# Patient Record
Sex: MALE | Race: WHITE | NOT HISPANIC OR LATINO | URBAN - METROPOLITAN AREA
[De-identification: names, ages, dates, MRNs, and addresses within clinical notes are randomized per-mention and may not be internally consistent; named-entity substitution may affect disease eponyms.]

---

## 2018-08-21 ENCOUNTER — INPATIENT (INPATIENT)
Facility: HOSPITAL | Age: 68
LOS: 5 days | Discharge: HOME HEALTH | End: 2018-08-27
Attending: INTERNAL MEDICINE | Admitting: INTERNAL MEDICINE
Payer: MEDICARE

## 2018-08-21 VITALS
DIASTOLIC BLOOD PRESSURE: 77 MMHG | RESPIRATION RATE: 16 BRPM | SYSTOLIC BLOOD PRESSURE: 103 MMHG | OXYGEN SATURATION: 96 % | HEART RATE: 97 BPM | TEMPERATURE: 98 F

## 2018-08-21 LAB
ALBUMIN SERPL ELPH-MCNC: 2.3 G/DL — LOW (ref 3.3–5)
ALP SERPL-CCNC: 88 U/L — SIGNIFICANT CHANGE UP (ref 40–120)
ALT FLD-CCNC: 97 U/L — HIGH (ref 4–41)
APTT BLD: 28.9 SEC — SIGNIFICANT CHANGE UP (ref 27.5–37.4)
AST SERPL-CCNC: 63 U/L — HIGH (ref 4–40)
BASE EXCESS BLDV CALC-SCNC: 1.4 MMOL/L — SIGNIFICANT CHANGE UP
BASOPHILS # BLD AUTO: 0.04 K/UL — SIGNIFICANT CHANGE UP (ref 0–0.2)
BASOPHILS NFR BLD AUTO: 0.2 % — SIGNIFICANT CHANGE UP (ref 0–2)
BILIRUB SERPL-MCNC: 0.8 MG/DL — SIGNIFICANT CHANGE UP (ref 0.2–1.2)
BLD GP AB SCN SERPL QL: NEGATIVE — SIGNIFICANT CHANGE UP
BLOOD GAS VENOUS - CREATININE: 0.79 MG/DL — SIGNIFICANT CHANGE UP (ref 0.5–1.3)
BUN SERPL-MCNC: 22 MG/DL — SIGNIFICANT CHANGE UP (ref 7–23)
CALCIUM SERPL-MCNC: 8.4 MG/DL — SIGNIFICANT CHANGE UP (ref 8.4–10.5)
CHLORIDE BLDV-SCNC: 97 MMOL/L — SIGNIFICANT CHANGE UP (ref 96–108)
CHLORIDE SERPL-SCNC: 94 MMOL/L — LOW (ref 98–107)
CO2 SERPL-SCNC: 22 MMOL/L — SIGNIFICANT CHANGE UP (ref 22–31)
CREAT SERPL-MCNC: 0.85 MG/DL — SIGNIFICANT CHANGE UP (ref 0.5–1.3)
EOSINOPHIL # BLD AUTO: 0 K/UL — SIGNIFICANT CHANGE UP (ref 0–0.5)
EOSINOPHIL NFR BLD AUTO: 0 % — SIGNIFICANT CHANGE UP (ref 0–6)
GAS PNL BLDV: 130 MMOL/L — LOW (ref 136–146)
GLUCOSE BLDV-MCNC: 129 — HIGH (ref 70–99)
GLUCOSE SERPL-MCNC: 127 MG/DL — HIGH (ref 70–99)
HCO3 BLDV-SCNC: 25 MMOL/L — SIGNIFICANT CHANGE UP (ref 20–27)
HCT VFR BLD CALC: 40.4 % — SIGNIFICANT CHANGE UP (ref 39–50)
HCT VFR BLDV CALC: 43.1 % — SIGNIFICANT CHANGE UP (ref 39–51)
HGB BLD-MCNC: 13 G/DL — SIGNIFICANT CHANGE UP (ref 13–17)
HGB BLDV-MCNC: 14 G/DL — SIGNIFICANT CHANGE UP (ref 13–17)
IMM GRANULOCYTES # BLD AUTO: 0.37 # — SIGNIFICANT CHANGE UP
IMM GRANULOCYTES NFR BLD AUTO: 2.1 % — HIGH (ref 0–1.5)
INR BLD: 1.57 — HIGH (ref 0.88–1.17)
LACTATE BLDV-MCNC: 1.7 MMOL/L — SIGNIFICANT CHANGE UP (ref 0.5–2)
LYMPHOCYTES # BLD AUTO: 1.4 K/UL — SIGNIFICANT CHANGE UP (ref 1–3.3)
LYMPHOCYTES # BLD AUTO: 8 % — LOW (ref 13–44)
MCHC RBC-ENTMCNC: 31.1 PG — SIGNIFICANT CHANGE UP (ref 27–34)
MCHC RBC-ENTMCNC: 32.2 % — SIGNIFICANT CHANGE UP (ref 32–36)
MCV RBC AUTO: 96.7 FL — SIGNIFICANT CHANGE UP (ref 80–100)
MONOCYTES # BLD AUTO: 0.81 K/UL — SIGNIFICANT CHANGE UP (ref 0–0.9)
MONOCYTES NFR BLD AUTO: 4.6 % — SIGNIFICANT CHANGE UP (ref 2–14)
NEUTROPHILS # BLD AUTO: 14.81 K/UL — HIGH (ref 1.8–7.4)
NEUTROPHILS NFR BLD AUTO: 85.1 % — HIGH (ref 43–77)
NRBC # FLD: 0 — SIGNIFICANT CHANGE UP
NT-PROBNP SERPL-SCNC: 243.4 PG/ML — SIGNIFICANT CHANGE UP
PCO2 BLDV: 36 MMHG — LOW (ref 41–51)
PH BLDV: 7.45 PH — HIGH (ref 7.32–7.43)
PLATELET # BLD AUTO: 335 K/UL — SIGNIFICANT CHANGE UP (ref 150–400)
PMV BLD: 10.6 FL — SIGNIFICANT CHANGE UP (ref 7–13)
PO2 BLDV: 30 MMHG — LOW (ref 35–40)
POTASSIUM BLDV-SCNC: 4.3 MMOL/L — SIGNIFICANT CHANGE UP (ref 3.4–4.5)
POTASSIUM SERPL-MCNC: 4.3 MMOL/L — SIGNIFICANT CHANGE UP (ref 3.5–5.3)
POTASSIUM SERPL-SCNC: 4.3 MMOL/L — SIGNIFICANT CHANGE UP (ref 3.5–5.3)
PROT SERPL-MCNC: 8.2 G/DL — SIGNIFICANT CHANGE UP (ref 6–8.3)
PROTHROM AB SERPL-ACNC: 18.2 SEC — HIGH (ref 9.8–13.1)
RBC # BLD: 4.18 M/UL — LOW (ref 4.2–5.8)
RBC # FLD: 13.4 % — SIGNIFICANT CHANGE UP (ref 10.3–14.5)
RH IG SCN BLD-IMP: POSITIVE — SIGNIFICANT CHANGE UP
SAO2 % BLDV: 52.5 % — LOW (ref 60–85)
SODIUM SERPL-SCNC: 128 MMOL/L — LOW (ref 135–145)
TROPONIN T, HIGH SENSITIVITY: < 6 NG/L — SIGNIFICANT CHANGE UP (ref ?–14)
WBC # BLD: 17.43 K/UL — HIGH (ref 3.8–10.5)
WBC # FLD AUTO: 17.43 K/UL — HIGH (ref 3.8–10.5)

## 2018-08-21 PROCEDURE — 71045 X-RAY EXAM CHEST 1 VIEW: CPT | Mod: 26

## 2018-08-21 RX ORDER — PIPERACILLIN AND TAZOBACTAM 4; .5 G/20ML; G/20ML
3.38 INJECTION, POWDER, LYOPHILIZED, FOR SOLUTION INTRAVENOUS ONCE
Qty: 0 | Refills: 0 | Status: COMPLETED | OUTPATIENT
Start: 2018-08-21 | End: 2018-08-21

## 2018-08-21 RX ORDER — SODIUM CHLORIDE 9 MG/ML
1000 INJECTION INTRAMUSCULAR; INTRAVENOUS; SUBCUTANEOUS ONCE
Qty: 0 | Refills: 0 | Status: COMPLETED | OUTPATIENT
Start: 2018-08-21 | End: 2018-08-21

## 2018-08-21 RX ORDER — VANCOMYCIN HCL 1 G
1000 VIAL (EA) INTRAVENOUS ONCE
Qty: 0 | Refills: 0 | Status: COMPLETED | OUTPATIENT
Start: 2018-08-21 | End: 2018-08-22

## 2018-08-21 RX ADMIN — PIPERACILLIN AND TAZOBACTAM 200 GRAM(S): 4; .5 INJECTION, POWDER, LYOPHILIZED, FOR SOLUTION INTRAVENOUS at 23:35

## 2018-08-21 RX ADMIN — SODIUM CHLORIDE 1000 MILLILITER(S): 9 INJECTION INTRAMUSCULAR; INTRAVENOUS; SUBCUTANEOUS at 20:51

## 2018-08-21 RX ADMIN — SODIUM CHLORIDE 1000 MILLILITER(S): 9 INJECTION INTRAMUSCULAR; INTRAVENOUS; SUBCUTANEOUS at 22:00

## 2018-08-21 NOTE — CONSULT NOTE ADULT - ATTENDING COMMENTS
patient with severe bullous disease predominantly upper lobe. underlying pneumonia In left upper lobe. would recommend medical management of pneumonia. may be candidate for LVRS if meets criteria. can be completed as outpatient. VQ scan/echo/abg/pft's/assess for pah.   follow up as outpatient once treatment for pneumonia complete

## 2018-08-21 NOTE — ED ADULT NURSE NOTE - NSIMPLEMENTINTERV_GEN_ALL_ED
Implemented All Universal Safety Interventions:  [place] to call system. Call bell, personal items and telephone within reach. Instruct patient to call for assistance. Room bathroom lighting operational. Non-slip footwear when patient is off stretcher. Physically safe environment: no spills, clutter or unnecessary equipment. Stretcher in lowest position, wheels locked, appropriate side rails in place.

## 2018-08-21 NOTE — ED PROVIDER NOTE - MEDICAL DECISION MAKING DETAILS
effusion, may be 2/2 cancer vs pna vs other infection vs CHF. will do basic labs, cardiac enzymes, pro BNP, CXR, CTA, dispo pending.

## 2018-08-21 NOTE — CONSULT NOTE ADULT - SUBJECTIVE AND OBJECTIVE BOX
68 year old male with past history of right pleural effusion requiring chest tube drainage in 2006 presents to ER with left pleural effusion.  Patient was visiting [place] and felt chest pain radiating to back about three weeks ago and traveled to [place] for medical follow up.  Workup showed left pleural effusion. Patient returned from [place] today and presented to ER for further treatment.  Patient states that he has loss of appetite and weight loss noted.  Patient states that since 2006, he has to take frequent stops to catch his breath.      PAST MEDICAL & SURGICAL HISTORY:  Right pleural effusion in 2006 requiring chest tube   No significant past surgical history    Allergies    No Known Allergies    Intolerances    Home Medications:  none    Social History:  Denies alcohol use  Quit smoking in 1990, smoked half a pack/day     REVIEW OF SYSTEMS      General: +weight loss     Skin/Breast: No Rashes/ Lesions/ Masses  	  Ophthalmologic: No Blurry vision/ Glaucoma/ Blindness  	  ENMT: No Hearing loss/ Drainage/ Lesions	    Respiratory and Thorax: +Cough with Sputum production  	  Cardiovascular: Chest pain radiating to back     Gastrointestinal: No Nausea/ Vomiting/ Constipation/+ Appetite Change	    Genitourinary: No Heamturia/ Dysuria/ Frequency change/ Impotence	    Musculoskeletal: No Pain/ Weakness/ Claudication	    Neurological: No Seizures/ TIA/CVA/ Parastesias	    Psychiatric: No Dementia/ Depression/ SI/HI	    Hematology/Lymphatics: No hx of bleeding/ +Bilateral pedal Edema	    Endocrine:	No Hyperglycemia/ Hypoglycemia    Allergic/Immunologic:	 No Anaphylaxis/ Intolerance/ Recent illnesses    PHYSICAL EXAM:  Vital Signs Last 24 Hrs  T(C): 36.9 (21 Aug 2018 22:17), Max: 37.1 (21 Aug 2018 20:50)  T(F): 98.5 (21 Aug 2018 22:17), Max: 98.7 (21 Aug 2018 20:50)  HR: 77 (21 Aug 2018 22:17) (77 - 97)  BP: 98/70 (21 Aug 2018 22:17) (96/63 - 103/77)  BP(mean): --  RR: 23 (21 Aug 2018 22:17) (16 - 23)  SpO2: 98% (21 Aug 2018 22:17) (94% - 98%)    General: WN/WD NAD  Neurology: A&Ox3, nonfocal, JACOBSON x 4  Eyes: PERRLA/ EOMI, Gross vision intact  ENT/Neck: Neck supple, trachea midline, No JVD, Gross hearing intact  Respiratory: CTA B/L, No wheezing, rales, rhonchi  CV: RRR, S1S2, no murmurs, rubs or gallops  Abdominal: Soft, NT, ND +BS,   Extremities: No edema, + peripheral pulses  Skin: No Rashes, Hematoma, Ecchymosis  (08-21 @ 19:15)                      13.0  17.43 )-----------( 335                 40.4    Neutrophils = 14.81 (85.1%)  Lymphocytes = 1.40 (8.0%)  Eosinophils = 0.00 (0.0%)  Basophils = 0.04 (0.2%)  Monocytes = 0.81 (4.6%)  Bands = --%    08-21    128<L>  |  94<L>  |  22  ----------------------------<  127<H>  4.3   |  22  |  0.85    Ca    8.4      21 Aug 2018 19:15    TPro  8.2  /  Alb  2.3<L>  /  TBili  0.8  /  DBili  x   /  AST  63<H>  /  ALT  97<H>  /  AlkPhos  88  08-21    ( 21 Aug 2018 19:15 )   PT: 18.2 SEC;   INR: 1.57 ;       PTT:28.9 SEC    Venous Blood Gas:  08-21 @ 19:15  7.45/36/30/25/52.5  VBG Lactate: 1.7   CXR: Left loculated hydroptx 68 year old male with past history of right pleural effusion requiring chest tube drainage in 2006 presents to ER with left pleural effusion.  Patient was visiting [place] and felt chest pain radiating to back with shortness of breath about three weeks ago and traveled to [place] for medical follow up.  Workup showed loculated  left pleural effusion, plan was to drain the effusion, but patient returned from [place] today and presented to ER for further treatment.  Patient states that he has loss of appetite and weight loss noted.  Patient states that since 2006, he has to take frequent stops to catch his breath.      PAST MEDICAL & SURGICAL HISTORY:  Right pleural effusion in 2006 requiring chest tube   No significant past surgical history    Allergies    No Known Allergies    Intolerances    Home Medications:  none    Social History:  Denies alcohol use  Quit smoking in 1990, smoked half a pack/day     REVIEW OF SYSTEMS      General: +weight loss     Skin/Breast: No Rashes/ Lesions/ Masses  	  Ophthalmologic: No Blurry vision/ Glaucoma/ Blindness  	  ENMT: No Hearing loss/ Drainage/ Lesions	    Respiratory and Thorax: +Cough with Sputum production, +SOB   	  Cardiovascular: Chest pain radiating to back     Gastrointestinal: No Nausea/ Vomiting/ Constipation/+ Appetite Change	    Genitourinary: No Heamturia/ Dysuria/ Frequency change/ Impotence	    Musculoskeletal: No Pain/ Weakness/ Claudication	    Neurological: No Seizures/ TIA/CVA/ Parastesias	    Psychiatric: No Dementia/ Depression/ SI/HI	    Hematology/Lymphatics: No hx of bleeding/ +Bilateral pedal Edema	    Endocrine:	No Hyperglycemia/ Hypoglycemia    Allergic/Immunologic:	 No Anaphylaxis/ Intolerance/ Recent illnesses    PHYSICAL EXAM:  Vital Signs Last 24 Hrs  T(C): 36.9 (21 Aug 2018 22:17), Max: 37.1 (21 Aug 2018 20:50)  T(F): 98.5 (21 Aug 2018 22:17), Max: 98.7 (21 Aug 2018 20:50)  HR: 77 (21 Aug 2018 22:17) (77 - 97)  BP: 98/70 (21 Aug 2018 22:17) (96/63 - 103/77)  BP(mean): --  RR: 23 (21 Aug 2018 22:17) (16 - 23)  SpO2: 98% (21 Aug 2018 22:17) (94% - 98%)    General: WN/WD NAD  Neurology: A&Ox3, nonfocal, JACOBSON x 4  Eyes: PERRLA/ EOMI, Gross vision intact  ENT/Neck: Neck supple, trachea midline, No JVD, Gross hearing intact  Respiratory: CTA B/L, No wheezing, rales, rhonchi  CV: RRR, S1S2, no murmurs, rubs or gallops  Abdominal: Soft, NT, ND +BS,   Extremities: No edema, + peripheral pulses  Skin: No Rashes, Hematoma, Ecchymosis  (08-21 @ 19:15)                      13.0  17.43 )-----------( 335                 40.4    Neutrophils = 14.81 (85.1%)  Lymphocytes = 1.40 (8.0%)  Eosinophils = 0.00 (0.0%)  Basophils = 0.04 (0.2%)  Monocytes = 0.81 (4.6%)  Bands = --%    08-21    128<L>  |  94<L>  |  22  ----------------------------<  127<H>  4.3   |  22  |  0.85    Ca    8.4      21 Aug 2018 19:15    TPro  8.2  /  Alb  2.3<L>  /  TBili  0.8  /  DBili  x   /  AST  63<H>  /  ALT  97<H>  /  AlkPhos  88  08-21    ( 21 Aug 2018 19:15 )   PT: 18.2 SEC;   INR: 1.57 ;       PTT:28.9 SEC    Venous Blood Gas:  08-21 @ 19:15  7.45/36/30/25/52.5  VBG Lactate: 1.7   CXR: Left loculated hydroptx

## 2018-08-21 NOTE — ED ADULT NURSE NOTE - ED STAT RN HANDOFF DETAILS
Patient is A&Ox4, aware of plan of care, in NAD, and has room available.  Report given to nurse on floor via phone.  Patient awaiting transportation.  Will continue to monitor patient closely. YOEL Saeed R.N.

## 2018-08-21 NOTE — CONSULT NOTE ADULT - ASSESSMENT
68 year old male with left sided loculated hydroptx     Plan:  Admit to medicine for management of hyponatremia and workup of elevated wbc  Will follow up CT scan to determine treatment of pleural effusion   Above discussed with Dr. Aguilar

## 2018-08-21 NOTE — ED ADULT TRIAGE NOTE - CHIEF COMPLAINT QUOTE
arrives today from visiting [place] and [place], with c/o SOB and left sided chest pain x 5 days with worsening of symptoms.  Patient was admitted to hospital in [facility] for "fluid in Lungs" and signed out AMA, because he wanted [place]

## 2018-08-21 NOTE — ED PROVIDER NOTE - PROGRESS NOTE DETAILS
AJM: Pt's son is  (Konstantin) AJM: unable to load imaged from OSH CT chest. will order CTA chest here for further imaging. son agrees with plan. Jc: rediscussed w/ ct surg. recommending medicine admit, will continue to follow. no caute intervention for now. d/w hospitalist and text paged mar. pt currently sleeping comfortably, no resp distress.

## 2018-08-21 NOTE — ED ADULT NURSE NOTE - OBJECTIVE STATEMENT
Pt brought to rm5 with history of right sided plural effusion and thoracentesis 6 years ago. Pt is past smoker. Pt states 5 days ago he was in [facility] with similar symptoms and MD in [facility] said he needed thoracentsis again. Pt wanted to have care in the [place], and flew straight here. Pt brought to 5 with history of right sided plural effusion and thoracentesis 6 years ago. Pt is past smoker. Pt states 5 days ago he was in [facility] with similar symptoms and MD in [facility] said he needed thoracentesis again. Pt wanted to have care in the [place], and came straight from airport.    Pt states he has chest pain that radiates to left back when  coughing (productive yellow mucus) or sneezing, has SOB, Cap refill is <2sec, pulses are present in all extremities. Lungs sounds are diminished with bilateral crackles. Son states that feet look swollen because they were on plane for so long. No pitting edema noted.    20G IV inserted in right AC. labs sent and drawn as ordered.

## 2018-08-21 NOTE — ED ADULT NURSE NOTE - CHIEF COMPLAINT QUOTE
arrives today from visiting [place], with c/o SOB and left sided chest pain x 5 days with worsening of symptoms.  Patient was admitted to hospital in [facility] for "fluid in Lungs" and signed out AMA, because he wanted tx done [place]

## 2018-08-21 NOTE — ED PROVIDER NOTE - PHYSICAL EXAMINATION
Gen: NAD, AOx3, non-toxic //            Head: NCAT //            HEENT: EOMI, oral mucosa moist, normal conjunctiva //            Lung: diminished lung sounds at the bases bilaterally, crackes on the left >R. Mildy tachypnic,  speaking in full sentences. //            CV: RRR, no murmurs, rubs or gallops //            Abd: soft, NTND, no guarding, no CVA tenderness //            MSK: no visible deformities //            Neuro: No focal sensory or motor deficits //            Skin: Warm, well perfused, no rash //            Psych: normal affect. ~Khloe Steen M.D., Ph.D. -Resident

## 2018-08-21 NOTE — ED PROVIDER NOTE - ATTENDING CONTRIBUTION TO CARE
AJM: Patient seen with resident and agree with above note. 8 male history of effusion on the right s/p tube thoracostomy here for left sided effusion and CP/SOB. Symptoms began several weeks ago shortly before leaving for a trip to [place]. While in [place] symptoms worsened. His son states he was admitted to a hospital and told he needs thoracentesis. Pt declined and came to [place] for treatment instead. Pt notes worsening COON and generalized weakness. Also with chest pain, worse with exertion. Ob exam pt has decreased breath sounds in left base. no resp distress. will obtain cxr, labs, trop, ecg. pt has copy of ct chest on cd. will upload. likely admit

## 2018-08-21 NOTE — ED PROVIDER NOTE - NS ED ROS FT
GENERAL: No fever or chills, //             EYES: no change in vision, //             HEENT: no trouble swallowing or speaking, //                    GI: no abdominal pain, no nausea or no vomiting, no diarrhea or constipation, //             : No changes in urination,  //            SKIN: no rashes,  //            NEURO: no headache,  //             MSK: No joint pain otherwise as HPI or negative. ~Khloe Steen M.D., Ph.D. -Resident

## 2018-08-21 NOTE — ED PROVIDER NOTE - OBJECTIVE STATEMENT
68 male history of effusion on the right s/p tube thoracostomy here for left sided effusion and CP/SOB. Onset weeks ago, went 68 male history of effusion on the right s/p tube thoracostomy here for left sided effusion and CP/SOB. Onset weeks ago, was living in [place]. In [place] had workup for shortness of breath and found to have loculated effusion on the left, plan was for tube thoracostomy but son brought patient to [facility] and [facility] for treatment. chest pain is left sided, intermittent, mild, nonradiating, getting worse. No leg swelling. no history of cancer.

## 2018-08-22 DIAGNOSIS — Z29.9 ENCOUNTER FOR PROPHYLACTIC MEASURES, UNSPECIFIED: ICD-10-CM

## 2018-08-22 DIAGNOSIS — J94.8 OTHER SPECIFIED PLEURAL CONDITIONS: ICD-10-CM

## 2018-08-22 DIAGNOSIS — A41.9 SEPSIS, UNSPECIFIED ORGANISM: ICD-10-CM

## 2018-08-22 DIAGNOSIS — Z98.890 OTHER SPECIFIED POSTPROCEDURAL STATES: Chronic | ICD-10-CM

## 2018-08-22 DIAGNOSIS — R18.8 OTHER ASCITES: ICD-10-CM

## 2018-08-22 DIAGNOSIS — J44.9 CHRONIC OBSTRUCTIVE PULMONARY DISEASE, UNSPECIFIED: ICD-10-CM

## 2018-08-22 DIAGNOSIS — E87.1 HYPO-OSMOLALITY AND HYPONATREMIA: ICD-10-CM

## 2018-08-22 LAB
ALBUMIN SERPL ELPH-MCNC: 2.3 G/DL — LOW (ref 3.3–5)
ALP SERPL-CCNC: 74 U/L — SIGNIFICANT CHANGE UP (ref 40–120)
ALT FLD-CCNC: 85 U/L — HIGH (ref 4–41)
AST SERPL-CCNC: 61 U/L — HIGH (ref 4–40)
B PERT DNA SPEC QL NAA+PROBE: SIGNIFICANT CHANGE UP
BILIRUB SERPL-MCNC: 0.7 MG/DL — SIGNIFICANT CHANGE UP (ref 0.2–1.2)
BUN SERPL-MCNC: 18 MG/DL — SIGNIFICANT CHANGE UP (ref 7–23)
C PNEUM DNA SPEC QL NAA+PROBE: NOT DETECTED — SIGNIFICANT CHANGE UP
CALCIUM SERPL-MCNC: 8.1 MG/DL — LOW (ref 8.4–10.5)
CHLORIDE SERPL-SCNC: 100 MMOL/L — SIGNIFICANT CHANGE UP (ref 98–107)
CO2 SERPL-SCNC: 25 MMOL/L — SIGNIFICANT CHANGE UP (ref 22–31)
CREAT ?TM UR-MCNC: 62.4 MG/DL — SIGNIFICANT CHANGE UP
CREAT SERPL-MCNC: 0.78 MG/DL — SIGNIFICANT CHANGE UP (ref 0.5–1.3)
FLUAV H1 2009 PAND RNA SPEC QL NAA+PROBE: NOT DETECTED — SIGNIFICANT CHANGE UP
FLUAV H1 RNA SPEC QL NAA+PROBE: NOT DETECTED — SIGNIFICANT CHANGE UP
FLUAV H3 RNA SPEC QL NAA+PROBE: NOT DETECTED — SIGNIFICANT CHANGE UP
FLUAV SUBTYP SPEC NAA+PROBE: SIGNIFICANT CHANGE UP
FLUBV RNA SPEC QL NAA+PROBE: NOT DETECTED — SIGNIFICANT CHANGE UP
GLUCOSE SERPL-MCNC: 101 MG/DL — HIGH (ref 70–99)
GRAM STN SPT: SIGNIFICANT CHANGE UP
HADV DNA SPEC QL NAA+PROBE: NOT DETECTED — SIGNIFICANT CHANGE UP
HCOV 229E RNA SPEC QL NAA+PROBE: NOT DETECTED — SIGNIFICANT CHANGE UP
HCOV HKU1 RNA SPEC QL NAA+PROBE: NOT DETECTED — SIGNIFICANT CHANGE UP
HCOV NL63 RNA SPEC QL NAA+PROBE: NOT DETECTED — SIGNIFICANT CHANGE UP
HCOV OC43 RNA SPEC QL NAA+PROBE: NOT DETECTED — SIGNIFICANT CHANGE UP
HMPV RNA SPEC QL NAA+PROBE: NOT DETECTED — SIGNIFICANT CHANGE UP
HPIV1 RNA SPEC QL NAA+PROBE: NOT DETECTED — SIGNIFICANT CHANGE UP
HPIV2 RNA SPEC QL NAA+PROBE: NOT DETECTED — SIGNIFICANT CHANGE UP
HPIV3 RNA SPEC QL NAA+PROBE: NOT DETECTED — SIGNIFICANT CHANGE UP
HPIV4 RNA SPEC QL NAA+PROBE: NOT DETECTED — SIGNIFICANT CHANGE UP
M PNEUMO DNA SPEC QL NAA+PROBE: NOT DETECTED — SIGNIFICANT CHANGE UP
MAGNESIUM SERPL-MCNC: 2.3 MG/DL — SIGNIFICANT CHANGE UP (ref 1.6–2.6)
OSMOLALITY SERPL: 283 MOSMO/KG — SIGNIFICANT CHANGE UP (ref 275–295)
OSMOLALITY UR: 477 MOSMO/KG — SIGNIFICANT CHANGE UP (ref 50–1200)
PHOSPHATE SERPL-MCNC: 2.4 MG/DL — LOW (ref 2.5–4.5)
POTASSIUM SERPL-MCNC: 3.9 MMOL/L — SIGNIFICANT CHANGE UP (ref 3.5–5.3)
POTASSIUM SERPL-SCNC: 3.9 MMOL/L — SIGNIFICANT CHANGE UP (ref 3.5–5.3)
PROT SERPL-MCNC: 7.3 G/DL — SIGNIFICANT CHANGE UP (ref 6–8.3)
RSV RNA SPEC QL NAA+PROBE: NOT DETECTED — SIGNIFICANT CHANGE UP
RV+EV RNA SPEC QL NAA+PROBE: NOT DETECTED — SIGNIFICANT CHANGE UP
SODIUM SERPL-SCNC: 134 MMOL/L — LOW (ref 135–145)
SODIUM UR-SCNC: 35 MMOL/L — SIGNIFICANT CHANGE UP
SPECIMEN SOURCE: SIGNIFICANT CHANGE UP

## 2018-08-22 PROCEDURE — 76705 ECHO EXAM OF ABDOMEN: CPT | Mod: 26

## 2018-08-22 PROCEDURE — 12345: CPT | Mod: NC,GC

## 2018-08-22 PROCEDURE — 99223 1ST HOSP IP/OBS HIGH 75: CPT | Mod: GC

## 2018-08-22 PROCEDURE — 71275 CT ANGIOGRAPHY CHEST: CPT | Mod: 26

## 2018-08-22 PROCEDURE — 99223 1ST HOSP IP/OBS HIGH 75: CPT | Mod: 57

## 2018-08-22 RX ORDER — PIPERACILLIN AND TAZOBACTAM 4; .5 G/20ML; G/20ML
3.38 INJECTION, POWDER, LYOPHILIZED, FOR SOLUTION INTRAVENOUS EVERY 12 HOURS
Qty: 0 | Refills: 0 | Status: DISCONTINUED | OUTPATIENT
Start: 2018-08-22 | End: 2018-08-22

## 2018-08-22 RX ORDER — PIPERACILLIN AND TAZOBACTAM 4; .5 G/20ML; G/20ML
3.38 INJECTION, POWDER, LYOPHILIZED, FOR SOLUTION INTRAVENOUS EVERY 8 HOURS
Qty: 0 | Refills: 0 | Status: DISCONTINUED | OUTPATIENT
Start: 2018-08-22 | End: 2018-08-23

## 2018-08-22 RX ORDER — IPRATROPIUM/ALBUTEROL SULFATE 18-103MCG
3 AEROSOL WITH ADAPTER (GRAM) INHALATION EVERY 6 HOURS
Qty: 0 | Refills: 0 | Status: DISCONTINUED | OUTPATIENT
Start: 2018-08-22 | End: 2018-08-27

## 2018-08-22 RX ORDER — ENOXAPARIN SODIUM 100 MG/ML
40 INJECTION SUBCUTANEOUS EVERY 24 HOURS
Qty: 0 | Refills: 0 | Status: DISCONTINUED | OUTPATIENT
Start: 2018-08-22 | End: 2018-08-27

## 2018-08-22 RX ADMIN — Medication 250 MILLIGRAM(S): at 00:00

## 2018-08-22 RX ADMIN — Medication 1000 MILLIGRAM(S): at 01:00

## 2018-08-22 RX ADMIN — PIPERACILLIN AND TAZOBACTAM 25 GRAM(S): 4; .5 INJECTION, POWDER, LYOPHILIZED, FOR SOLUTION INTRAVENOUS at 21:37

## 2018-08-22 RX ADMIN — PIPERACILLIN AND TAZOBACTAM 3.38 GRAM(S): 4; .5 INJECTION, POWDER, LYOPHILIZED, FOR SOLUTION INTRAVENOUS at 00:00

## 2018-08-22 RX ADMIN — PIPERACILLIN AND TAZOBACTAM 25 GRAM(S): 4; .5 INJECTION, POWDER, LYOPHILIZED, FOR SOLUTION INTRAVENOUS at 13:09

## 2018-08-22 RX ADMIN — ENOXAPARIN SODIUM 40 MILLIGRAM(S): 100 INJECTION SUBCUTANEOUS at 07:22

## 2018-08-22 NOTE — PROGRESS NOTE ADULT - SUBJECTIVE AND OBJECTIVE BOX
CONTACT Olivia Booth MD                                                                                                                                              Internal Medicine PGY-1   [facility] Pager [phone], [facility] Pager [phone]  On weekdays after 7pm and weekends after 12pm, please contact [phone]    Patient not a reliable historian  Patient is a 68y old  Male w/ PMHx of R pleural eff s/p drainage in 2006 and smoking (0.5 pack/year, stopped in 1990), who presented to ED yesterday from airport (coming from [facility]) with complaints of SOB x 3 weeks, admitted for L pleural effusion. Pt was recently in [place] and had been having SOB for which he traveled to [facility] for medical work-up. There he was told that he had a L pleural effusion but refused tx and signed AMA to fly back to [place] for further medical management. de who presents with     INTERVAL HPI/OVERNIGHT EVENTS:  - No acute events overnight   - Went for abd U/S this am to r/o ascites     T(C): 36.2 (08-22-18 @ 06:05), Max: 37.1 (08-21-18 @ 20:50)  HR: 67 (08-22-18 @ 06:05) (65 - 97)  BP: 99/67 (08-22-18 @ 06:05) (94/62 - 103/77)  RR: 18 (08-22-18 @ 06:05) (16 - 23)  SpO2: 95% (08-22-18 @ 06:05) (94% - 98%)    PHYSICAL EXAM:  GENERAL: NAD, well-developed  HEAD:  Atraumatic, Normocephalic  EYES: EOMI, conjunctiva and sclera clear  NECK: Supple, No JVD  NERVOUS SYSTEM:  Alert & Oriented X3, Good concentration  CHEST/LUNG: Normal breathing on RA, +crackles b/l;   HEART: Regular rate and rhythm; No murmurs, rubs, or gallops  ABDOMEN: Soft, Nontender, mildly distended; Bowel sounds present, no fluid wave appreciated   EXTREMITIES:  No clubbing, cyanosis, or edema  LYMPH: No lymphadenopathy noted  SKIN: No rashes or lesions    I&O's Summary    22 Aug 2018 07:01  -  22 Aug 2018 10:51  --------------------------------------------------------  IN: 0 mL / OUT: 400 mL / NET: -400 mL      LABS:                        13.0   17.43 )-----------( 335      ( 21 Aug 2018 19:15 )             40.4     08-22    134<L>  |  100  |  18  ----------------------------<  101<H>  3.9   |  25  |  0.78    Ca    8.1<L>      22 Aug 2018 07:36  Phos  2.4     08-22  Mg     2.3     08-22    TPro  7.3  /  Alb  2.3<L>  /  TBili  0.7  /  DBili  x   /  AST  61<H>  /  ALT  85<H>  /  AlkPhos  74  08-22    PT/INR - ( 21 Aug 2018 19:15 )   PT: 18.2 SEC;   INR: 1.57          PTT - ( 21 Aug 2018 19:15 )  PTT:28.9 SEC    CAPILLARY BLOOD GLUCOSE    HOSPITAL MEDICATIONS:    MEDICATIONS  (STANDING):  enoxaparin Injectable 40 milliGRAM(s) SubCutaneous every 24 hours  piperacillin/tazobactam IVPB. 3.375 Gram(s) IV Intermittent every 8 hours      MEDICATIONS  (PRN):  ALBUTerol/ipratropium for Nebulization 3 milliLiter(s) Nebulizer every 6 hours PRN Shortness of Breath and/or Wheezing      HOME MEDICATIONS  Home Medications: CONTACT Olivia Booth MD                                                                                                                                              Internal Medicine PGY-1   [facility] Pager [phone], [facility] Pager [phone]  On weekdays after 7pm and weekends after 12pm, please contact [phone]    SUMMARY   Patient not a reliable historian  Patient is a 68y old  Male w/ Hx of R pleural eff s/p drainage in 2006 and smoking (0.5 pack/year, stopped in 1990), but no other PMhx, who presented to ED yesterday with complaints of worsening SOB, and was admitted for L pleural effusion. He has SOB at baseline ever since prior thoracentesis and was well until around July 25th when he went to [place]. He started having worsening SOB and pain in his L lung. He traveled to [facility] for medical work-up, where he was told that he had a L pleural effusion requiring drainage. He received 1-day of abx with Augmentin but refused further tx and signed AMA to [place] back to [place] further medical management. He has been feeling better since coming from [facility].     INTERVAL HPI/OVERNIGHT EVENTS:  - No acute events overnight   - Went for abd U/S this am to r/o ascites     T(C): 36.2 (08-22-18 @ 06:05), Max: 37.1 (08-21-18 @ 20:50)  HR: 67 (08-22-18 @ 06:05) (65 - 97)  BP: 99/67 (08-22-18 @ 06:05) (94/62 - 103/77)  RR: 18 (08-22-18 @ 06:05) (16 - 23)  SpO2: 95% (08-22-18 @ 06:05) (94% - 98%)    PHYSICAL EXAM:  GENERAL: NAD, well-developed  HEAD:  Atraumatic, Normocephalic  EYES: EOMI, conjunctiva and sclera clear  NECK: Supple, No JVD  NERVOUS SYSTEM:  Alert & Oriented X3, Good concentration  CHEST/LUNG: Normal breathing on RA, +crackles b/l;   HEART: very diminished heart sounds   ABDOMEN: Soft, Nontender, mildly distended; Bowel sounds present, no fluid wave appreciated   EXTREMITIES:  No clubbing, cyanosis, or edema  LYMPH: No lymphadenopathy noted  SKIN: No rashes or lesions    I&O's Summary    22 Aug 2018 07:01  -  22 Aug 2018 10:51  --------------------------------------------------------  IN: 0 mL / OUT: 400 mL / NET: -400 mL      LABS:                        13.0   17.43 )-----------( 335      ( 21 Aug 2018 19:15 )             40.4     08-22    134<L>  |  100  |  18  ----------------------------<  101<H>  3.9   |  25  |  0.78    Ca    8.1<L>      22 Aug 2018 07:36  Phos  2.4     08-22  Mg     2.3     08-22    TPro  7.3  /  Alb  2.3<L>  /  TBili  0.7  /  DBili  x   /  AST  61<H>  /  ALT  85<H>  /  AlkPhos  74  08-22    PT/INR - ( 21 Aug 2018 19:15 )   PT: 18.2 SEC;   INR: 1.57          PTT - ( 21 Aug 2018 19:15 )  PTT:28.9 SEC    CAPILLARY BLOOD GLUCOSE    HOSPITAL MEDICATIONS:    MEDICATIONS  (STANDING):  enoxaparin Injectable 40 milliGRAM(s) SubCutaneous every 24 hours  piperacillin/tazobactam IVPB. 3.375 Gram(s) IV Intermittent every 8 hours      MEDICATIONS  (PRN):  ALBUTerol/ipratropium for Nebulization 3 milliLiter(s) Nebulizer every 6 hours PRN Shortness of Breath and/or Wheezing      HOME MEDICATIONS  Home Medications: CONTACT Olivia Booth MD                                                                                                                                              Internal Medicine PGY-1   [facility] Pager [phone], [facility] Pager [phone]  On weekdays after 7pm and weekends after 12pm, please contact [phone]    SUMMARY   Patient not a reliable historian  Patient is a 68y old  Male w/ Hx of R pleural eff s/p drainage in 2006 and smoking (0.5 pack/day, stopped in 1990), but no other PMhx, who presented to ED yesterday with complaints of worsening SOB, and was admitted for L pleural effusion. He has SOB at baseline ever since prior thoracentesis and was well until around July 25th when he went to [place]. He started having worsening SOB and pain in his L lung. He traveled to [facility] for medical work-up, where he was told that he had a L pleural effusion requiring drainage. He received 1-day of abx with Augmentin but refused further tx and signed AMA to [place] back to [place] for further medical management. He has been feeling better since coming from [facility].     INTERVAL HPI/OVERNIGHT EVENTS:  - No acute events overnight   - Went for abd U/S this am to r/o ascites     T(C): 36.2 (08-22-18 @ 06:05), Max: 37.1 (08-21-18 @ 20:50)  HR: 67 (08-22-18 @ 06:05) (65 - 97)  BP: 99/67 (08-22-18 @ 06:05) (94/62 - 103/77)  RR: 18 (08-22-18 @ 06:05) (16 - 23)  SpO2: 95% (08-22-18 @ 06:05) (94% - 98%)    PHYSICAL EXAM:  GENERAL: NAD, well-developed  HEAD:  Atraumatic, Normocephalic  EYES: EOMI, conjunctiva and sclera clear  NECK: Supple, No JVD  NERVOUS SYSTEM:  Alert & Oriented X3, Good concentration  CHEST/LUNG: Normal breathing on RA, +crackles b/l;   HEART: very diminished heart sounds   ABDOMEN: Soft, Nontender, mildly distended; Bowel sounds present, no fluid wave appreciated   EXTREMITIES:  No clubbing, cyanosis, or edema  LYMPH: No lymphadenopathy noted  SKIN: No rashes or lesions    I&O's Summary    22 Aug 2018 07:01  -  22 Aug 2018 10:51  --------------------------------------------------------  IN: 0 mL / OUT: 400 mL / NET: -400 mL      LABS:                        13.0   17.43 )-----------( 335      ( 21 Aug 2018 19:15 )             40.4     08-22    134<L>  |  100  |  18  ----------------------------<  101<H>  3.9   |  25  |  0.78    Ca    8.1<L>      22 Aug 2018 07:36  Phos  2.4     08-22  Mg     2.3     08-22    TPro  7.3  /  Alb  2.3<L>  /  TBili  0.7  /  DBili  x   /  AST  61<H>  /  ALT  85<H>  /  AlkPhos  74  08-22    PT/INR - ( 21 Aug 2018 19:15 )   PT: 18.2 SEC;   INR: 1.57          PTT - ( 21 Aug 2018 19:15 )  PTT:28.9 SEC    CAPILLARY BLOOD GLUCOSE    HOSPITAL MEDICATIONS:    MEDICATIONS  (STANDING):  enoxaparin Injectable 40 milliGRAM(s) SubCutaneous every 24 hours  piperacillin/tazobactam IVPB. 3.375 Gram(s) IV Intermittent every 8 hours      MEDICATIONS  (PRN):  ALBUTerol/ipratropium for Nebulization 3 milliLiter(s) Nebulizer every 6 hours PRN Shortness of Breath and/or Wheezing      HOME MEDICATIONS  Home Medications:

## 2018-08-22 NOTE — H&P ADULT - NSHPSOCIALHISTORY_GEN_ALL_CORE
Lives in [place]. [place] the [place] for treatment of R pleural effusion.  Smoke: 7.5 pack year  EtOH: denies  Drugs: denies

## 2018-08-22 NOTE — H&P ADULT - PROBLEM SELECTOR PLAN 6
VTE: lovenox  Diet: regular  Dispo: home    LATOYA Burton MD-PGY2  Internal Medicine Department  Pager: [phone] / [phone]

## 2018-08-22 NOTE — PROGRESS NOTE ADULT - SUBJECTIVE AND OBJECTIVE BOX
Patient is a 68y old  Male w/ Hx of R pleural eff s/p drainage in 2006 and smoking (0.5 pack/day, stopped in 1990), but no other PMhx, who presented to ED yesterday with complaints of worsening SOB, and was admitted for L pleural effusion. He has SOB at baseline ever since prior thoracentesis and was well until around July 25th when he went to [place]. He started having worsening SOB and pain in his L lung. He traveled to \[facility]\"" for medical work-up, where he was told that he had a L pleural effusion requiring drainage. He received 1-day of abx with Augmentin but refused further tx and signed AMA to [place] back to [place] for further medical management. He has been feeling better since coming from \[facility]\"".   CT chest showing bullous emphysema, pna and loculated hydroptx.    Vital Signs Last 24 Hrs  T(C): 36.6 (22 Aug 2018 21:38), Max: 36.6 (22 Aug 2018 21:38)  T(F): 97.9 (22 Aug 2018 21:38), Max: 97.9 (22 Aug 2018 21:38)  HR: 74 (22 Aug 2018 21:38) (56 - 74)  BP: 100/66 (22 Aug 2018 21:38) (94/62 - 100/66)  BP(mean): --  RR: 18 (22 Aug 2018 21:38) (18 - 20)  SpO2: 97% (22 Aug 2018 21:38) (95% - 99%)  MEDICATIONS  (STANDING):  enoxaparin Injectable 40 milliGRAM(s) SubCutaneous every 24 hours  piperacillin/tazobactam IVPB. 3.375 Gram(s) IV Intermittent every 8 hours    General: WN/WD NAD  Neurology: A&Ox3, nonfocal, JACOBSON x 4  Eyes: PERRLA/ EOMI, Gross vision intact  ENT/Neck: Neck supple, trachea midline, No JVD, Gross hearing intact  Respiratory: decreased on left   CV: RRR, S1S2, no murmurs, rubs or gallops  Abdominal: Soft, NT, ND +BS,   Extremities: No edema, + peripheral pulses  Skin: No Rashes, Hematoma, Ecchymosis

## 2018-08-22 NOTE — H&P ADULT - ASSESSMENT
68 year old male with past history of right pleural effusion requiring chest tube drainage in 2006 presents to ER with left pleural effusion c/b sepsis, hyponatremia, and with distended abdomen concerning for ascites.

## 2018-08-22 NOTE — H&P ADULT - HISTORY OF PRESENT ILLNESS
68 year old male with past history of right pleural effusion requiring chest tube drainage in 2006 presents to ER with left pleural effusion.  Patient was visiting [place] and felt chest pain radiating to back with shortness of breath about three weeks ago and traveled to [place] for medical follow up. Patient states that since 2006, he has to take frequent stops to catch his breath. Workup showed loculated left pleural effusion, plan was to drain the effusion, but patient returned from [place] today and presented to ER for further treatment.  Patient states that he has loss of appetite and has lost 8kg in the last month. Pt also states that he has been having night sweats on and off for the last 2 weeks. Pt states that he smoked half a pack of cigarettes for 15 years, but quit in the 1980's. Pt denies ever have received colonoscopy or any other Ca screening in the past.     In the ED:   Vitals: 98.7, 97, 94/62, 97% on RA  Notable Labs and Imaging: WBC 17.43, Na 128, CT shows loculated L pleural effusion  Given: Vanc/Zosyn, 1L NS

## 2018-08-22 NOTE — H&P ADULT - NSHPPHYSICALEXAM_GEN_ALL_CORE
T(C): 36.4 (08-22-18 @ 02:46), Max: 37.1 (08-21-18 @ 20:50)  HR: 67 (08-22-18 @ 06:05) (65 - 97)  BP: 99/67 (08-22-18 @ 06:05) (94/62 - 103/77)  RR: 18 (08-22-18 @ 06:05) (16 - 23)  SpO2: 95% (08-22-18 @ 06:05) (94% - 98%)    GENERAL: NAD, well-developed  HEAD:  Atraumatic, Normocephalic  EYES: EOMI, PERRLA, conjunctiva and sclera clear  NECK: Supple, No JVD  CHEST/LUNG: reduced tio sounds over R lung  HEART: Regular rate and rhythm; No murmurs, rubs, or gallops  ABDOMEN: distended with positive fluid wave  EXTREMITIES:  2+ Peripheral Pulses, No clubbing, cyanosis, or edema  PSYCH: AAOx3  NEUROLOGY: non-focal  SKIN: No rashes or lesions T(C): 36.4 (08-22-18 @ 02:46), Max: 37.1 (08-21-18 @ 20:50)  HR: 67 (08-22-18 @ 06:05) (65 - 97)  BP: 99/67 (08-22-18 @ 06:05) (94/62 - 103/77)  RR: 18 (08-22-18 @ 06:05) (16 - 23)  SpO2: 95% (08-22-18 @ 06:05) (94% - 98%)    GENERAL: NAD, well-developed  HEAD:  Atraumatic, Normocephalic  EYES: EOMI, PERRLA, conjunctiva and sclera clear  NECK: Supple, No JVD  CHEST/LUNG: Normal resp effort, reduced lung sounds over R lung  HEART: Regular rate and rhythm; No murmurs, rubs, or gallops  ABDOMEN: distended with positive fluid wave  EXTREMITIES:  2+ Peripheral Pulses, No clubbing, cyanosis, or edema  PSYCH: AAOx3, normal affect, normal behavior   NEUROLOGY: non-focal. 5/5 strength, normal sensation   SKIN: No rashes or lesions

## 2018-08-22 NOTE — H&P ADULT - PROBLEM SELECTOR PLAN 2
pt w/ previous pleural effusion on R side, now with hydropneumothorax on L side. recurrence concerning for malignancy.   -CT surg on board  -possible drainage, will need to send fluid to cytology unclear source, may be SIADH if pt has malignancy  -will send urine studies for further evaluation

## 2018-08-22 NOTE — PROGRESS NOTE ADULT - PROBLEM SELECTOR PLAN 3
pt found to have emphysema on CT chest. pt with long standing smoking history.  -supportive management w/ for now pt found to have emphysema on CT chest. Smoked for 15 years 0.5pack per day, stopped 30 years ago  -supportive management

## 2018-08-22 NOTE — H&P ADULT - PROBLEM SELECTOR PLAN 4
pt w/ possible ascites on abd exam.  -will send for limited abdominal u/s for evaluation  -if fluid found, would perform diagnostic tap pt found to have emphysema on CT chest. pt with smoking history.  -supportive management w/ for now

## 2018-08-22 NOTE — PROGRESS NOTE ADULT - PROBLEM SELECTOR PLAN 2
unclear source, may be SIADH if pt has malignancy  -will send urine studies for further evaluation Resolving - likely hypovolemic hyponatremia given low bp on admission with rapid response to 1L NS bolus, SIADH less likely given urine lytes results  - improved from 129 to 134  - Urinalysis: Na 35, osm 477, cr 62

## 2018-08-22 NOTE — H&P ADULT - PROBLEM SELECTOR PLAN 3
unclear source, may be SIADH if pt has malignancy  -will send urine studies for further evaluation pt found to have emphysema on CT chest. pt with long standing smoking history.  -supportive management w/ for now

## 2018-08-22 NOTE — H&P ADULT - PROBLEM SELECTOR PLAN 5
VTE: lovenox  Diet: regular  Dispo: home    LATOYA Burton MD-PGY2  Internal Medicine Department  Pager: [phone] / [phone] pt w/ possible ascites on abd exam.  -will send for limited abdominal u/s for evaluation  -if fluid found, would perform diagnostic tap VTE: lovenox  Diet: regular  Dispo: home    LATOYA Burton MD-PGY2  Internal Medicine Department  Pager: [phone] / [phone]

## 2018-08-22 NOTE — PROGRESS NOTE ADULT - PROBLEM SELECTOR PLAN 4
pt w/ possible ascites on abd exam.  -will send for limited abdominal u/s for evaluation  -if fluid found, would perform diagnostic tap No evidence of ascites on abd U/S 8/22   - no need for diagnostic tap

## 2018-08-22 NOTE — H&P ADULT - NSHPLABSRESULTS_GEN_ALL_CORE
13.0   17.43 )-----------( 335      ( 21 Aug 2018 19:15 )             40.4       08-21    128<L>  |  94<L>  |  22  ----------------------------<  127<H>  4.3   |  22  |  0.85    Ca    8.4      21 Aug 2018 19:15    TPro  8.2  /  Alb  2.3<L>  /  TBili  0.8  /  DBili  x   /  AST  63<H>  /  ALT  97<H>  /  AlkPhos  88  08-21        PT/INR - ( 21 Aug 2018 19:15 )   PT: 18.2 SEC;   INR: 1.57        PTT - ( 21 Aug 2018 19:15 )  PTT:28.9 SEC    Lactate Trend  Blood Gas Venous - Lactate: 1.7: Please note updated reference range. mmol/L (08.21.18 @ 19:15)    < from: CT Angio Chest w/ IV Cont (08.22.18 @ 01:51) >    FINDINGS:    CHEST:     LUNGS AND LARGE AIRWAYS: Severe bullous paraseptal emphysema.   Consolidation of the left upper diffuse consolidation of the posterior   left upper lobe. Partial atelectasis of the left lower lobe.    Subsegmental atelectasis of the right middle lobe  PLEURA: Multiloculated left hydropneumothorax with multiple air-fluid   levels, predominantly in the left upper lung.  VESSELS: No pulmonary embolus.  HEART: Heart size is normal. Coronary artery calcifications. No   pericardial effusion.  MEDIASTINUM AND KARYN: No lymphadenopathy.  CHEST WALL AND LOWER NECK: Within normal limits.  VISUALIZED UPPER ABDOMEN: Status post cholecystectomy  BONES: Within normal limits.    IMPRESSION:   No pulmonary embolus.    Multiloculated left hydropneumothorax with multiple air-fluid levels   predominantly in the leftupper lobe.    Consolidation of the left upper lobe, likely representing infection.    Severe bullous paraseptal emphysema.     Comment: A large loculated hydropneumothorax in an unusual location in a   patient with underlying bullous emphysema is concerning for   bronchopleural fistula.    < end of copied text >

## 2018-08-22 NOTE — PROGRESS NOTE ADULT - PROBLEM SELECTOR PLAN 1
Presented w/ SOB, found to have on CT large L loculated hydropneumothorax concerning for bronchopleural fistula in the setting of bullous emphysema. Pt w/ previous pleural effusion on R side, now with hydropneumothorax on L side. recurrence concerning for possible malignancy especially given smoking history  - Met sepsis criteria - tachycardic, leukocytosis, w/ PNA and adjacent hydropneumothorax as source.   -s/p 1L NS and Vanc/Zosyn in the ED. would continue with Zosyn for now  - f/u blood culture  - Sputum culture sent  - high risk for thoracocentesis given impressive bullous emphysema   - CT surgery was consulted in ED for thoracentesis, but risky given impressive bullous emphysema Pt w/ previous pleural effusion on R side, now with large L loculated hydropneumothorax concerning for bronchopleural fistula in the setting of bullous emphysema, Recurrence concerning for possible malignancy especially given smoking history  - Met sepsis criteria on admission - tachycardic, leukocytosis, adjacent hydropneumothorax as source.   -s/p 1L NS and Vanc/Zosyn in the ED.   - Improved clinically, will continue with Zosyn for now  - f/u blood culture  - f/u RSV result   - f/u Sputum culture    - CT surgery f/u for bronchopleural fistula  - will contact home pulmonologist Gilbert Pt w/ previous pleural effusion on R side, now with large L loculated hydropneumothorax concerning for bronchopleural fistula in the setting of bullous emphysema, Recurrence concerning for possible malignancy especially given smoking history  - Met sepsis criteria on admission - tachycardic, leukocytosis, adjacent hydropneumothorax as source.   -s/p 1L NS and Vanc/Zosyn in the ED.   - Improved clinically, will continue with Zosyn for now  - f/u blood culture  - f/u RVP result   - f/u Sputum culture    - CT surgery f/u for bronchopleural fistula  - will contact home pulmonologist Gilbert

## 2018-08-22 NOTE — H&P ADULT - NSHPREVIEWOFSYSTEMS_GEN_ALL_CORE
CONSTITUTIONAL: as per HPI  EYES/ENT: No visual changes;  No vertigo or throat pain   NECK: No pain or stiffness  RESPIRATORY: as per HPI  CARDIOVASCULAR: No chest pain or palpitations  GASTROINTESTINAL: No abdominal or epigastric pain. No n/v/c/d, melena, or hematochezia.  GENITOURINARY: No dysuria, frequency or hematuria  NEUROLOGICAL:  No headache, dizziness, syncope.  MUSCULOSKELETAL:  No muscle, back pain, joint pain or stiffness.  HEMATOLOGIC:  No anemia, bleeding or bruising.  LYMPHATICS:  No enlarged nodes. No history of splenectomy.  PSYCHIATRIC:  No history of depression or anxiety.  ENDOCRINOLOGIC:  No reports of sweating, cold or heat intolerance. No polyuria or polydipsia.  ALLERGIES:  No history of asthma, hives, eczema or rhinitis. CONSTITUTIONAL: as per HPI  EYES/ENT: No visual changes;  No vertigo or throat pain   NECK: No pain or stiffness  RESPIRATORY: +shortness of breath, + cough  CARDIOVASCULAR: No chest pain or palpitations  GASTROINTESTINAL: No abdominal or epigastric pain. No n/v/c/d, melena, or hematochezia.  GENITOURINARY: No dysuria, frequency or hematuria  NEUROLOGICAL:  No headache, dizziness, syncope.  MUSCULOSKELETAL:  No muscle, back pain, joint pain or stiffness.  HEMATOLOGIC:  No anemia, bleeding or bruising.  LYMPHATICS:  No enlarged nodes. No history of splenectomy.  PSYCHIATRIC:  No depression or anxiety.  ENDOCRINOLOGIC:  No reports of sweating, cold or heat intolerance. No polyuria or polydipsia.  ALLERGIES:  No history of asthma, hives, eczema or rhinitis.

## 2018-08-22 NOTE — PROGRESS NOTE ADULT - ASSESSMENT
68 year old male with past history of Smoking and R pleural effusion requiring chest tube drainage in 2006, who presented to ER with SOB, admitted for left loculated pleural effusion as seen on CT c/b sepsis, hyponatremia, and with distended abdomen concerning for ascites.

## 2018-08-22 NOTE — PROGRESS NOTE ADULT - PROBLEM SELECTOR PLAN 5
VTE: lovenox  Diet: regular  Dispo: home    LATOYA Burton MD-PGY2  Internal Medicine Department  Pager: [phone] / [phone] VTE: lovenox  Diet: regular  Dispo: home

## 2018-08-22 NOTE — PROGRESS NOTE ADULT - ASSESSMENT
68y old  Male w/ Hx of R pleural eff s/p drainage in 2006 and smoking (0.5 pack/day, stopped in 1990), but no other PMhx, presents with loculated left pleural effusion.    Plan:   CT scan reviewed by Dr. Aguilar, recommend IV antibiotics.   No acute thoracic intervention at present.    Once patient recovers, consider lung reduction surgery.    Care as per primary team

## 2018-08-23 ENCOUNTER — TRANSCRIPTION ENCOUNTER (OUTPATIENT)
Age: 68
End: 2018-08-23

## 2018-08-23 LAB
ALBUMIN SERPL ELPH-MCNC: 2 G/DL — LOW (ref 3.3–5)
ALP SERPL-CCNC: 67 U/L — SIGNIFICANT CHANGE UP (ref 40–120)
ALT FLD-CCNC: 108 U/L — HIGH (ref 4–41)
AST SERPL-CCNC: 98 U/L — HIGH (ref 4–40)
BILIRUB SERPL-MCNC: 0.5 MG/DL — SIGNIFICANT CHANGE UP (ref 0.2–1.2)
BUN SERPL-MCNC: 17 MG/DL — SIGNIFICANT CHANGE UP (ref 7–23)
BUN SERPL-MCNC: 17 MG/DL — SIGNIFICANT CHANGE UP (ref 7–23)
CALCIUM SERPL-MCNC: 7.8 MG/DL — LOW (ref 8.4–10.5)
CALCIUM SERPL-MCNC: 7.8 MG/DL — LOW (ref 8.4–10.5)
CHLORIDE SERPL-SCNC: 101 MMOL/L — SIGNIFICANT CHANGE UP (ref 98–107)
CHLORIDE SERPL-SCNC: 101 MMOL/L — SIGNIFICANT CHANGE UP (ref 98–107)
CO2 SERPL-SCNC: 23 MMOL/L — SIGNIFICANT CHANGE UP (ref 22–31)
CO2 SERPL-SCNC: 23 MMOL/L — SIGNIFICANT CHANGE UP (ref 22–31)
CREAT SERPL-MCNC: 0.81 MG/DL — SIGNIFICANT CHANGE UP (ref 0.5–1.3)
CREAT SERPL-MCNC: 0.81 MG/DL — SIGNIFICANT CHANGE UP (ref 0.5–1.3)
GLUCOSE SERPL-MCNC: 104 MG/DL — HIGH (ref 70–99)
GLUCOSE SERPL-MCNC: 104 MG/DL — HIGH (ref 70–99)
HCT VFR BLD CALC: 35.5 % — LOW (ref 39–50)
HGB BLD-MCNC: 11.6 G/DL — LOW (ref 13–17)
MCHC RBC-ENTMCNC: 31.4 PG — SIGNIFICANT CHANGE UP (ref 27–34)
MCHC RBC-ENTMCNC: 32.7 % — SIGNIFICANT CHANGE UP (ref 32–36)
MCV RBC AUTO: 95.9 FL — SIGNIFICANT CHANGE UP (ref 80–100)
NRBC # FLD: 0 — SIGNIFICANT CHANGE UP
PHOSPHATE SERPL-MCNC: 2.6 MG/DL — SIGNIFICANT CHANGE UP (ref 2.5–4.5)
PLATELET # BLD AUTO: 362 K/UL — SIGNIFICANT CHANGE UP (ref 150–400)
PMV BLD: 10.2 FL — SIGNIFICANT CHANGE UP (ref 7–13)
POTASSIUM SERPL-MCNC: 3.8 MMOL/L — SIGNIFICANT CHANGE UP (ref 3.5–5.3)
POTASSIUM SERPL-MCNC: 3.8 MMOL/L — SIGNIFICANT CHANGE UP (ref 3.5–5.3)
POTASSIUM SERPL-SCNC: 3.8 MMOL/L — SIGNIFICANT CHANGE UP (ref 3.5–5.3)
POTASSIUM SERPL-SCNC: 3.8 MMOL/L — SIGNIFICANT CHANGE UP (ref 3.5–5.3)
PROT SERPL-MCNC: 6.7 G/DL — SIGNIFICANT CHANGE UP (ref 6–8.3)
RBC # BLD: 3.7 M/UL — LOW (ref 4.2–5.8)
RBC # FLD: 13.1 % — SIGNIFICANT CHANGE UP (ref 10.3–14.5)
SODIUM SERPL-SCNC: 134 MMOL/L — LOW (ref 135–145)
SODIUM SERPL-SCNC: 134 MMOL/L — LOW (ref 135–145)
WBC # BLD: 8.92 K/UL — SIGNIFICANT CHANGE UP (ref 3.8–10.5)
WBC # FLD AUTO: 8.92 K/UL — SIGNIFICANT CHANGE UP (ref 3.8–10.5)

## 2018-08-23 PROCEDURE — 99222 1ST HOSP IP/OBS MODERATE 55: CPT

## 2018-08-23 PROCEDURE — 99233 SBSQ HOSP IP/OBS HIGH 50: CPT | Mod: GC

## 2018-08-23 RX ORDER — ERTAPENEM SODIUM 1 G/1
1 INJECTION, POWDER, LYOPHILIZED, FOR SOLUTION INTRAMUSCULAR; INTRAVENOUS
Qty: 1 | Refills: 0 | OUTPATIENT
Start: 2018-08-23 | End: 2018-09-08

## 2018-08-23 RX ORDER — ERTAPENEM SODIUM 1 G/1
1000 INJECTION, POWDER, LYOPHILIZED, FOR SOLUTION INTRAMUSCULAR; INTRAVENOUS ONCE
Qty: 0 | Refills: 0 | Status: COMPLETED | OUTPATIENT
Start: 2018-08-23 | End: 2018-08-23

## 2018-08-23 RX ORDER — ERTAPENEM SODIUM 1 G/1
1000 INJECTION, POWDER, LYOPHILIZED, FOR SOLUTION INTRAMUSCULAR; INTRAVENOUS EVERY 24 HOURS
Qty: 0 | Refills: 0 | Status: DISCONTINUED | OUTPATIENT
Start: 2018-08-24 | End: 2018-08-27

## 2018-08-23 RX ORDER — ERTAPENEM SODIUM 1 G/1
INJECTION, POWDER, LYOPHILIZED, FOR SOLUTION INTRAMUSCULAR; INTRAVENOUS
Qty: 0 | Refills: 0 | Status: DISCONTINUED | OUTPATIENT
Start: 2018-08-23 | End: 2018-08-27

## 2018-08-23 RX ADMIN — PIPERACILLIN AND TAZOBACTAM 25 GRAM(S): 4; .5 INJECTION, POWDER, LYOPHILIZED, FOR SOLUTION INTRAVENOUS at 05:51

## 2018-08-23 RX ADMIN — ENOXAPARIN SODIUM 40 MILLIGRAM(S): 100 INJECTION SUBCUTANEOUS at 06:38

## 2018-08-23 RX ADMIN — PIPERACILLIN AND TAZOBACTAM 25 GRAM(S): 4; .5 INJECTION, POWDER, LYOPHILIZED, FOR SOLUTION INTRAVENOUS at 13:48

## 2018-08-23 RX ADMIN — ERTAPENEM SODIUM 120 MILLIGRAM(S): 1 INJECTION, POWDER, LYOPHILIZED, FOR SOLUTION INTRAMUSCULAR; INTRAVENOUS at 17:36

## 2018-08-23 NOTE — PHYSICAL THERAPY INITIAL EVALUATION ADULT - PERTINENT HX OF CURRENT PROBLEM, REHAB EVAL
68y old  Male w/ Hx of R pleural eff s/p drainage in 2006 and smoking (0.5 pack/day, stopped in 1990), but no other PMhx, who presented to ED yesterday with complaints of worsening SOB, and was admitted for L pleural effusion.

## 2018-08-23 NOTE — DISCHARGE NOTE ADULT - ADDITIONAL INSTRUCTIONS
Please follow up with the following specialist:  Pulmonologist -- Dr. Hunt  Cardiothoracic Surgeon-- Dr. Aguilar  Infectious disease specialist Dr. Lugo  Gastrointestinal specialist --Dr. Andres

## 2018-08-23 NOTE — DISCHARGE NOTE ADULT - PATIENT PORTAL LINK FT
You can access the [email or web address], offered by [facility], by registering with the following website: [email or web address]

## 2018-08-23 NOTE — DISCHARGE NOTE ADULT - CONDITIONS AT DISCHARGE
The Patient is stable, improved ; no complaints of SOB, Chest Pain and he has not a Cough.  His Room Air Pulse Oximetry is 98%, vital signs are stable and Discharge Instructions are discussed and given. The Patient is stable, improved ; no complaints of SOB, Chest Pain and he has not a Cough.  His Room Air Pulse Oximetry is 98%, vital signs are stable and Discharge Instructions are discussed and given.  She is discharged with the Picc Line in the Left Upper Arm, dressing was changed on 8/27/2018 and this is newly placed today also.  Ertapenem is given now and VNS will continue this tomorrow.

## 2018-08-23 NOTE — PROGRESS NOTE ADULT - PROBLEM SELECTOR PLAN 2
Resolving - likely hypovolemic hyponatremia given low bp on admission with rapid response to 1L NS bolus, SIADH less likely given urine lytes results  - improved from 129 to 134  - Urinalysis: Na 35, osm 477, cr 62

## 2018-08-23 NOTE — PROGRESS NOTE ADULT - PROBLEM SELECTOR PLAN 3
pt found to have emphysema on CT chest. Smoked for 15 years 0.5pack per day, stopped 30 years ago  -supportive management

## 2018-08-23 NOTE — PHYSICAL THERAPY INITIAL EVALUATION ADULT - ADDITIONAL COMMENTS
Patient will stay with son in an apartment with steps to enter; no Assistive Device prior to admission

## 2018-08-23 NOTE — DISCHARGE NOTE ADULT - FINDINGS/TREATMENT
A PICC line was placed to facilitate long-term administration of your IV antibiotic called Ertapenem.

## 2018-08-23 NOTE — PROGRESS NOTE ADULT - PROBLEM SELECTOR PLAN 1
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   -s/p 1L NS and Vanc/Zosyn loading dose in the ED.   - Improved clinically, will continue with Zosyn for now  - Bld cx 8/21 NGTD  - RVP panel neg   - +GPC in pairs in Sputum culture    - Seen CT surgery: no acute intervention for bronchopleural fistula. Can consider lung reduction surgery later   - will contact home pulmonologist Dr. 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   -s/p 1L NS and Vanc/Zosyn loading dose in the ED.   - Improved significantly clinically, ID c/s for possible transition to PO abx given community acquired, however hesitant given anatomic abnormalities   - Bld cx 8/21 NGTD  - RVP panel neg   - +GPC in pairs in Sputum culture    - Seen CT surgery: no acute intervention for bronchopleural fistula. Can consider lung reduction surgery later   - will contact home pulmonologist Dr. Hunt

## 2018-08-23 NOTE — PROGRESS NOTE ADULT - SUBJECTIVE AND OBJECTIVE BOX
CONTACT Olivia Booth MD                                                                                                                                              Internal Medicine PGY-1   [facility] Pager [phone], [facility] Pager [phone]  On weekdays after 7pm and weekends after 12pm, please contact [phone]    SUMMARY   Patient not a reliable historian  Patient is a 68y old  Male w/ Hx of R pleural eff s/p drainage in 2006 and smoking (0.5 pack/day, stopped in 1990), but no other PMhx, who presented to ED yesterday with complaints of worsening SOB, and was admitted for L pleural effusion. He has SOB at baseline ever since prior thoracentesis and was well until around July 25th when he went to [place]. He started having worsening SOB and pain in his L lung. He traveled to [facility] for medical work-up, where he was told that he had a L pleural effusion requiring drainage. He received 1-day of abx with Augmentin but refused further tx and signed AMA to fly back to [place] for further medical management. He has been feeling better since coming from [facility].     INTERVAL HPI/OVERNIGHT EVENTS:  - No acute events overnight   - Went for abd U/S this am to r/o ascites     T(C): 36.2 (08-22-18 @ 06:05), Max: 37.1 (08-21-18 @ 20:50)  HR: 67 (08-22-18 @ 06:05) (65 - 97)  BP: 99/67 (08-22-18 @ 06:05) (94/62 - 103/77)  RR: 18 (08-22-18 @ 06:05) (16 - 23)  SpO2: 95% (08-22-18 @ 06:05) (94% - 98%)    PHYSICAL EXAM:  GENERAL: NAD, well-developed  HEAD:  Atraumatic, Normocephalic  EYES: EOMI, conjunctiva and sclera clear  NECK: Supple, No JVD  NERVOUS SYSTEM:  Alert & Oriented X3, Good concentration  CHEST/LUNG: Normal breathing on RA, +crackles b/l;   HEART: very diminished heart sounds   ABDOMEN: Soft, Nontender, mildly distended; Bowel sounds present, no fluid wave appreciated   EXTREMITIES:  No clubbing, cyanosis, or edema  LYMPH: No lymphadenopathy noted  SKIN: No rashes or lesions    I&O's Summary    22 Aug 2018 07:01  -  22 Aug 2018 10:51  --------------------------------------------------------  IN: 0 mL / OUT: 400 mL / NET: -400 mL      LABS:                        13.0   17.43 )-----------( 335      ( 21 Aug 2018 19:15 )             40.4     08-22    134<L>  |  100  |  18  ----------------------------<  101<H>  3.9   |  25  |  0.78    Ca    8.1<L>      22 Aug 2018 07:36  Phos  2.4     08-22  Mg     2.3     08-22    TPro  7.3  /  Alb  2.3<L>  /  TBili  0.7  /  DBili  x   /  AST  61<H>  /  ALT  85<H>  /  AlkPhos  74  08-22    PT/INR - ( 21 Aug 2018 19:15 )   PT: 18.2 SEC;   INR: 1.57          PTT - ( 21 Aug 2018 19:15 )  PTT:28.9 SEC    CAPILLARY BLOOD GLUCOSE    HOSPITAL MEDICATIONS:    MEDICATIONS  (STANDING):  enoxaparin Injectable 40 milliGRAM(s) SubCutaneous every 24 hours  piperacillin/tazobactam IVPB. 3.375 Gram(s) IV Intermittent every 8 hours      MEDICATIONS  (PRN):  ALBUTerol/ipratropium for Nebulization 3 milliLiter(s) Nebulizer every 6 hours PRN Shortness of Breath and/or Wheezing      HOME MEDICATIONS  Home Medications:

## 2018-08-23 NOTE — DISCHARGE NOTE ADULT - HOME CARE AGENCY
[facility]   The day after discharge, the Nurse will call you to make arrangements to see you at home [facility]   The day after discharge, the Nurse will call you to make arrangements to see you at home.

## 2018-08-23 NOTE — DISCHARGE NOTE ADULT - PROVIDER TOKENS
TOKEN:'[id number]',TOKEN:'[id number]',FREE:[LAST:[marah],FIRST:[Isaiah],PHONE:[phone]],FAX:[(   )    -]],TOKEN:'[id number]'

## 2018-08-23 NOTE — CONSULT NOTE ADULT - SUBJECTIVE AND OBJECTIVE BOX
HPI:  68 year old male with past history of right pleural effusion requiring chest tube drainage in  presents to ER with left pleural effusion.      The patient was in his usual state of health up until his travel to [place] on .  His son met him in [place] around 8/10.  At that time, the son noted that the pt was complained of left sided chest pain.  He also seemed to have increased shortness of breath, worse with exertion, better with rest.     He presented to the hospital in [place] and was diagnosed with a pleural effusion. He was not given abx.    On , he travelled to [facility]. His respiratory status had worsened. He was admitted to the hospital in [facility] and was told that he had fluid around his lung due to an infection. He was discharged on augmentin to follow up here.     He states he felt a little better after starting abx.               PAST MEDICAL & SURGICAL HISTORY:  No pertinent past medical history  H/O chest tube placement for right sided pleural effusion in     SOCIAL HISTORY:  former smoker  retired doorman  no alcohol  no drug  use  travel: [facility] [place]    FAMILY HISTORY:  father  at 109- no known cause  Mother  at 96- no known cause  Brother  of lung cancer in his 40s      REVIEW OF SYSTEMS  [  ] ROS unobtainable because:    [ x ] All other systems negative except as noted below:	    Constitutional:  [ ] fever [ ] chills  [ ] weight loss  [x ] weakness  Skin:  [ ] rash [ ] phlebitis	  Eyes: [ ] icterus [ ] pain  [ ] discharge	  ENMT: [ ] sore throat  [ ] thrush [ ] ulcers [ ] exudates  Respiratory: [x ] dyspnea [ ] hemoptysis [ ] cough [ ] sputum	  Cardiovascular:  [ ] chest pain [ ] palpitations [ ] edema	  Gastrointestinal:  [ ] nausea [ ] vomiting [ ] diarrhea [ ] constipation [ ] pain	  Genitourinary:  [ ] dysuria [ ] frequency [ ] hematuria [ ] discharge [ ] flank pain  [ ] incontinence  Musculoskeletal:  [ ] myalgias [ ] arthralgias [ ] arthritis  [ ] back pain  Neurological:  [ ] headache [ ] seizures  [ ] confusion/altered mental status  Psychiatric:  [ ] anxiety [ ] depression	  Hematology/Lymphatics:  [ ] lymphadenopathy  Endocrine:  [ ] adrenal [ ] thyroid  Allergic/Immunologic:	 [ ] transplant [ ] seasonal    PHYSICAL EXAM:  General: [x ] non-toxic  HEAD/EYES: [ ] PERRL [x ] white sclera [ ] icterus  ENT:  [ ] normal [x ] supple [ ] thrush [ ] pharyngeal exudate  Cardiovascular:   [ ] murmur [ x] normal [ ] PPM/AICD  Respiratory:  [x ] clear to ausculation bilaterally  GI:  [x ] soft, non-tender, normal bowel sounds  :  [ ] grant [ ] no CVA tenderness   Musculoskeletal:  [ x] no synovitis  Neurologic:  [x ] non-focal exam   Skin:  [x ] no rash  Lymph: [x ] no lymphadenopathy  Psychiatric:  [x ] appropriate affect [ ] alert & oriented  Lines:  [ x] no phlebitis [ ] central line          Drug Dosing Weight  Height (cm): 167 (22 Aug 2018 05:42)  Weight (kg): 80.5 (22 Aug 2018 05:42)  BMI (kg/m2): 28.9 (22 Aug 2018 05:42)  BSA (m2): 1.9 (22 Aug 2018 05:42)    Vital Signs Last 24 Hrs  T(F): 98.4 (18 @ 13:50), Max: 98.7 (18 @ 20:50)    Vital Signs Last 24 Hrs  HR: 79 (18 @ 13:50) (66 - 79)  BP: 106/62 (18 @ 13:50) (97/65 - 106/62)  RR: 18 (18 @ 13:50)  SpO2: 98% (18 @ 13:50) (97% - 98%)  Wt(kg): --                          11.6   8.92  )-----------( 362      ( 23 Aug 2018 06:30 )             35.5           134<L>  |  101  |  17  ----------------------------<  104<H>  3.8   |  23  |  0.81    Ca    7.8<L>      23 Aug 2018 06:30  Phos  2.6       Mg     2.3         TPro  6.7  /  Alb  2.0<L>  /  TBili  0.5  /  DBili  x   /  AST  98<H>  /  ALT  108<H>  /  AlkPhos  67            MICROBIOLOGY:    RADIOLOGY: reviewed    < from: CT Angio Chest w/ IV Cont (18 @ 01:51) >  Comment: A large loculated hydropneumothorax in an unusual location in a   patient with underlying bullous emphysema is concerning for   bronchopleural fistula.    < end of copied text >

## 2018-08-23 NOTE — DISCHARGE NOTE ADULT - CARE PLAN
Principal Discharge DX:	Hydropneumothorax  Secondary Diagnosis:	Transaminitis  Secondary Diagnosis:	COPD (chronic obstructive pulmonary disease) Principal Discharge DX:	Hydropneumothorax  Goal:	Ongoing treatment  Assessment and plan of treatment:	You were presented to the hospital with complaints of shortness of breath and were found to have fluid and an infection in your Left lung. You were treated with the appropriate antibiotics. You will continue to receive an intravenous antibiotic called Ertapenem for two more weeks through 9/10/18. You had a peripheral inserted central catheter (PICC) line placed today to facilitate long-term administration of your medication. A nurse aid will come to your home to show you how to administer your antibiotic through the PICC line, please look below for more information about that. It is very important that you follow up with your pulmonologist Dr. Hunt within 1 week of discharge. If you experience fever, chest pain or shortness of breath, please call your doctor or present to the emergency department for evaluation.  Secondary Diagnosis:	Transaminitis  Goal:	Outpatient follow up  Assessment and plan of treatment:	During this admission you were found to have elevated  Secondary Diagnosis:	COPD (chronic obstructive pulmonary disease) Principal Discharge DX:	Hydropneumothorax  Goal:	Ongoing treatment  Assessment and plan of treatment:	You were presented to the hospital with complaints of shortness of breath and were found to have fluid in both lungs and an infection in your Left lung. You were treated with the appropriate antibiotics in the hospital but you will need an intravenous antibiotic called Ertapenem for two more weeks through 9/10/18. You had a peripheral inserted central catheter (PICC) line placed today to facilitate long-term administration of your medication. A nurse aid will come to your home to show you how to administer your antibiotic through the PICC line, please look below for more information about that. You will also need weekly laboratory testing that includes a complete blood count (cbc) to check your blood levels and a basic metabolic panel (bmp) to check your electrolytes. The results of your blood work needs to be faxed to your infectious disease doctor Richar Lugo at [phone] every week. It is very important that you follow up with your pulmonologist Dr. Hunt within 1 week of discharge. Finally, please follow up with the cardiothoracic surgeon Dr. Aguilar 3-4 weeks after discharge (after completion of your antibiotic treatment) for further management and possibly new imaging of your lungs. If you experience fever, chest pain or shortness of breath, please call your doctor or present to the emergency department for evaluation.  Secondary Diagnosis:	Transaminitis  Goal:	Outpatient follow up  Assessment and plan of treatment:	During this admission you were found to have elevated liver enzymes. Your work up for that including liver infection panel was negative. You also had an ultrasound of your liver which showed that your non-alcoholic fatty liver disease, a benign condition called hepatic steatosis. You have no symptoms for that and do not need any medications for it. Please follow up outpatient with a gastroenterologist, Dr. Charlotte Andres for further management at your earliest convenience. If you experience abdominal pain, nausea, vomiting or fever, please call your doctor or present to the emergency department for evaluation.  Secondary Diagnosis:	COPD (chronic obstructive pulmonary disease)  Goal:	Outpatient follow up  Assessment and plan of treatment:	You came to the hospital with complaints of shortness of breath and were found to have damage in your lung presenting as a condition called emphysema, often related to smoking. Please follow up with your pulmonologist within 1-2 weeks of discharge. If you experience chest pain or shortness of breath, please call your doctor or present to the emergency department for evaluation.

## 2018-08-23 NOTE — DISCHARGE NOTE ADULT - MEDICATION SUMMARY - MEDICATIONS TO TAKE
I will START or STAY ON the medications listed below when I get home from the hospital:    Saline Flush  -- Please flush 10cc   -- Indication: For Hydropneumothorax    ertapenem 1 g injection  -- 1 gram(s) intravenously once a day     -Weekly CBC and CMP    -D/c PICC line when therapy completed   -- Indication: For Hydropneumothorax

## 2018-08-23 NOTE — DISCHARGE NOTE ADULT - PLAN OF CARE
Ongoing treatment You were presented to the hospital with complaints of shortness of breath and were found to have fluid and an infection in your Left lung. You were treated with the appropriate antibiotics. You will continue to receive an intravenous antibiotic called Ertapenem for two more weeks through 9/10/18. You had a peripheral inserted central catheter (PICC) line placed today to facilitate long-term administration of your medication. A nurse aid will come to your home to show you how to administer your antibiotic through the PICC line, please look below for more information about that. It is very important that you follow up with your pulmonologist Dr. Hunt within 1 week of discharge. If you experience fever, chest pain or shortness of breath, please call your doctor or present to the emergency department for evaluation. Outpatient follow up During this admission you were found to have elevated You were presented to the hospital with complaints of shortness of breath and were found to have fluid in both lungs and an infection in your Left lung. You were treated with the appropriate antibiotics in the hospital but you will need an intravenous antibiotic called Ertapenem for two more weeks through 9/10/18. You had a peripheral inserted central catheter (PICC) line placed today to facilitate long-term administration of your medication. A nurse aid will come to your home to show you how to administer your antibiotic through the PICC line, please look below for more information about that. You will also need weekly laboratory testing that includes a complete blood count (cbc) to check your blood levels and a basic metabolic panel (bmp) to check your electrolytes. The results of your blood work needs to be faxed to your infectious disease doctor Richar Lugo at [phone] every week. It is very important that you follow up with your pulmonologist Dr. Hunt within 1 week of discharge. Finally, please follow up with the cardiothoracic surgeon Dr. Aguilar 3-4 weeks after discharge (after completion of your antibiotic treatment) for further management and possibly new imaging of your lungs. If you experience fever, chest pain or shortness of breath, please call your doctor or present to the emergency department for evaluation. During this admission you were found to have elevated liver enzymes. Your work up for that including liver infection panel was negative. You also had an ultrasound of your liver which showed that your non-alcoholic fatty liver disease, a benign condition called hepatic steatosis. You have no symptoms for that and do not need any medications for it. Please follow up outpatient with a gastroenterologist, Dr. Charlotte Andres for further management at your earliest convenience. If you experience abdominal pain, nausea, vomiting or fever, please call your doctor or present to the emergency department for evaluation. You came to the hospital with complaints of shortness of breath and were found to have damage in your lung presenting as a condition called emphysema, often related to smoking. Please follow up with your pulmonologist within 1-2 weeks of discharge. If you experience chest pain or shortness of breath, please call your doctor or present to the emergency department for evaluation.

## 2018-08-23 NOTE — CONSULT NOTE ADULT - ASSESSMENT
68 year old with a prior history of a left sided pleural effusion presents with recent increased shortness or breath/ dyspnea on exertion and leukocytosis with a upper lobe consolidation with loculated effusion.    He presented on Augmentin  The associated leukocytosis suggests an infectious process.  The does seem to have underlying structural lung disease.    Consider drainage of fluid if feasible.    If not feasible, plan for a 2-3 week course of antibiotics with subsequent repeat chest imaging.    Change zosyn to ertapenem for now.    CT surgery following for ? structural lung disease.     Plan discussed with primary team. 68 year old with a prior history of a left sided pleural effusion presents with recent increased shortness or breath/ dyspnea on exertion and leukocytosis with a upper lobe consolidation with loculated effusion.    He presented on Augmentin  The associated leukocytosis suggests an infectious process.  The does seem to have underlying structural lung disease.    Possible pneumonia with parapneumonic effusion but location is atypical.   Imaging raised concern for a fistula.     Consider drainage of fluid if feasible.    If not feasible, plan for a 2-3 week course of antibiotics with subsequent repeat chest imaging.    Change zosyn to ertapenem for now.    CT surgery following for ? structural lung disease.     Plan discussed with primary team.

## 2018-08-23 NOTE — PROGRESS NOTE ADULT - ASSESSMENT
CONTACT Olivia Booth MD                                                                                                                                              Internal Medicine PGY-1   [facility] Pager [phone], [facility] Pager [phone]  On weekdays after 7pm and weekends after 12pm, please contact [phone]      Patient is a 68y old  Male who presents with a chief complaint of sob (22 Aug 2018 06:57)    SUMMARY   Patient not a reliable historian  Patient is a 68y old  Male w/ Hx of R pleural eff s/p drainage in 2006 and smoking (0.5 pack/day, stopped in 1990), but no other PMhx, who presented to ED yesterday with complaints of worsening SOB, and was admitted for L pleural effusion. He has SOB at baseline ever since prior thoracentesis and was well until around July 25th when he went to [place]. He started having worsening SOB and pain in his L lung. He traveled to [facility] for medical work-up, where he was told that he had a L pleural effusion requiring drainage. He received 1-day of abx with Augmentin but refused further tx and signed AMA to fly back to [place] for further medical management. He has been feeling better since coming from [facility].     INTERVAL HPI/OVERNIGHT EVENTS:  - Seen by CTS yesterday night, no acute surgical intervention recommended   - No acute events overnight   - Denies CP, SOB, HA, dizziness, Fever, Abd pain, diarrhea or constipation    T(C): 36.7 (08-23-18 @ 05:50), Max: 36.7 (08-23-18 @ 05:50)  HR: 66 (08-23-18 @ 05:50) (56 - 74)  BP: 97/65 (08-23-18 @ 05:50) (94/64 - 100/66)  RR: 18 (08-23-18 @ 05:50) (18 - 18)  SpO2: 97% (08-23-18 @ 05:50) (97% - 99%)    PHYSICAL EXAM:  GENERAL: NAD, well-developed  HEAD:  Atraumatic, Normocephalic  EYES: EOMI, conjunctiva and sclera clear  NECK: Supple, No JVD  NERVOUS SYSTEM:  Alert & Oriented X3, Good concentration  CHEST/LUNG: Normal breathing on RA, +crackles b/l;   HEART: very diminished heart sounds   ABDOMEN: Soft, Nontender, mildly distended; Bowel sounds present, no fluid wave appreciated   EXTREMITIES:  No clubbing, cyanosis, or edema  LYMPH: No lymphadenopathy noted    I&O's Summary    22 Aug 2018 07:01  -  23 Aug 2018 07:00  --------------------------------------------------------  IN: 0 mL / OUT: 400 mL / NET: -400 mL      LABS:                        13.0   17.43 )-----------( 335      ( 21 Aug 2018 19:15 )             40.4     08-22    134<L>  |  100  |  18  ----------------------------<  101<H>  3.9   |  25  |  0.78    Ca    8.1<L>      22 Aug 2018 07:36  Phos  2.4     08-22  Mg     2.3     08-22    TPro  7.3  /  Alb  2.3<L>  /  TBili  0.7  /  DBili  x   /  AST  61<H>  /  ALT  85<H>  /  AlkPhos  74  08-22    PT/INR - ( 21 Aug 2018 19:15 )   PT: 18.2 SEC;   INR: 1.57          PTT - ( 21 Aug 2018 19:15 )  PTT:28.9 SEC    HOSPITAL MEDICATIONS:    MEDICATIONS  (STANDING):  enoxaparin Injectable 40 milliGRAM(s) SubCutaneous every 24 hours  piperacillin/tazobactam IVPB. 3.375 Gram(s) IV Intermittent every 8 hours      MEDICATIONS  (PRN):  ALBUTerol/ipratropium for Nebulization 3 milliLiter(s) Nebulizer every 6 hours PRN Shortness of Breath and/or Wheezing    HOME MEDICATIONS  Home Medications:

## 2018-08-23 NOTE — DISCHARGE NOTE ADULT - CARE PROVIDER_API CALL
Richar Lugo), Infectious Disease  [address] [place] [address]  Phone: [phone]  Fax: [phone]    Lea Aguilar), Surgery; Thoracic Surgery  [address] [place] [address]  Phone: [phone]  Fax: [phone]    Isaiah crystal  Phone: [phone]  Fax: (   )    -    Charlotte Andres (MD; MBBS), Gastroenterology  [address] [place] [address]  Phone: [phone]  Fax: [phone]

## 2018-08-23 NOTE — DISCHARGE NOTE ADULT - CARE PROVIDERS DIRECT ADDRESSES
,[email or web address],DirectAddress_Unknown,DirectAddress_Unknown

## 2018-08-23 NOTE — DISCHARGE NOTE ADULT - HOSPITAL COURSE
Mr. Jauregui is a 68 years old M with a PMHx of R pleural effusion s/p chest tube drainage (2006), who presented to ED with complaints of shortness breath and admitted for a left hydropneumothorax in setting of bullous emphysema. He underwent extensive infectious work up including blood cultures, sputum cultures and RVP, which was negative. 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 CT surgery was consulted and did not recommend any surgical intervention at this time. He was started on Vancomycin/Zosyn and transitioned to Zosyn only later on. Per ID recommendations, he was switched to IV ertapenem for broader coverage for a 3-week total abx course through 9/10. On 8/27 a PICC line was placed for long-term abx administration at home. He was instructed to follow up with ID for weekly cbc and bmp and with CTS after completing abx tx for repeat imaging and evaluation for lung reduction surgery. He was also advised to follow up with his pulmonologist Dr. Hunt.    During this hospitalization he was found to have asymptomatic mild hyponatremia and elevated liver enzymes. An abdominal U/S showed hepatic steatosis consistent with non-alcoholic fatty liver disease. His anti-trypsin levels were also measured to rule out deficiency given elevated LFTs and advanced lung disease despite moderate smoking hx, and were found to be high. He was advised to follow outpatient with a GI specialist. He is medically stable and ready for discharge home. Mr. Jauregui is a 68 years old M with a PMHx of R pleural effusion s/p chest tube drainage (2006), who presented to ED with complaints of shortness breath and admitted for a left hydropneumothorax in setting of bullous emphysema. He underwent extensive infectious work up including blood cultures, sputum cultures and RVP, which was negative. 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 CT surgery was consulted and did not recommend any surgical intervention at this time. He was started on Vancomycin/Zosyn and transitioned to Zosyn only later on. Per ID recommendations, he was switched to IV ertapenem for broader coverage for a 3-week total abx course through 9/10. On 8/27 a PICC line was placed for long-term abx administration at home. He was instructed to follow up with ID for weekly cbc and bmp and with CTS after completing abx tx for repeat imaging and evaluation for lung reduction surgery. He was also advised to follow up with his pulmonologist Dr. Hunt.    During this hospitalization he was found to have asymptomatic mild hyponatremia and elevated liver enzymes. An abdominal U/S showed hepatic steatosis consistent with non-alcoholic fatty liver disease. His anti-trypsin levels were also measured to rule out deficiency given elevated LFTs and advanced lung disease despite moderate smoking hx, but his levels were not found to be low. He was advised to follow outpatient with a GI specialist. He is medically stable and ready for discharge home.

## 2018-08-23 NOTE — PROGRESS NOTE ADULT - PROBLEM SELECTOR PLAN 5
VTE: lovenox  Diet: regular  Dispo: home    LATOYA Burton MD-PGY2  Internal Medicine Department  Pager: [phone] / [phone] VTE: lovenox  Diet: regular  Dispo: home

## 2018-08-24 DIAGNOSIS — R74.0 NONSPECIFIC ELEVATION OF LEVELS OF TRANSAMINASE AND LACTIC ACID DEHYDROGENASE [LDH]: ICD-10-CM

## 2018-08-24 LAB
ALBUMIN SERPL ELPH-MCNC: 2.2 G/DL — LOW (ref 3.3–5)
ALP SERPL-CCNC: 70 U/L — SIGNIFICANT CHANGE UP (ref 40–120)
ALT FLD-CCNC: 118 U/L — HIGH (ref 4–41)
AST SERPL-CCNC: 76 U/L — HIGH (ref 4–40)
BACTERIA SPT RESP CULT: SIGNIFICANT CHANGE UP
BILIRUB DIRECT SERPL-MCNC: 0.2 MG/DL — SIGNIFICANT CHANGE UP (ref 0.1–0.2)
BILIRUB SERPL-MCNC: 0.6 MG/DL — SIGNIFICANT CHANGE UP (ref 0.2–1.2)
BUN SERPL-MCNC: 12 MG/DL — SIGNIFICANT CHANGE UP (ref 7–23)
CALCIUM SERPL-MCNC: 8.1 MG/DL — LOW (ref 8.4–10.5)
CHLORIDE SERPL-SCNC: 104 MMOL/L — SIGNIFICANT CHANGE UP (ref 98–107)
CO2 SERPL-SCNC: 20 MMOL/L — LOW (ref 22–31)
CREAT SERPL-MCNC: 0.74 MG/DL — SIGNIFICANT CHANGE UP (ref 0.5–1.3)
GLUCOSE SERPL-MCNC: 105 MG/DL — HIGH (ref 70–99)
HAV IGM SER-ACNC: NONREACTIVE — SIGNIFICANT CHANGE UP
HBV CORE IGM SER-ACNC: NONREACTIVE — SIGNIFICANT CHANGE UP
HBV SURFACE AG SER-ACNC: NONREACTIVE — SIGNIFICANT CHANGE UP
HCT VFR BLD CALC: 36.1 % — LOW (ref 39–50)
HCV AB S/CO SERPL IA: 0.09 S/CO — SIGNIFICANT CHANGE UP
HCV AB SERPL-IMP: SIGNIFICANT CHANGE UP
HGB BLD-MCNC: 11.8 G/DL — LOW (ref 13–17)
MAGNESIUM SERPL-MCNC: 2 MG/DL — SIGNIFICANT CHANGE UP (ref 1.6–2.6)
MCHC RBC-ENTMCNC: 31.5 PG — SIGNIFICANT CHANGE UP (ref 27–34)
MCHC RBC-ENTMCNC: 32.7 % — SIGNIFICANT CHANGE UP (ref 32–36)
MCV RBC AUTO: 96.3 FL — SIGNIFICANT CHANGE UP (ref 80–100)
NRBC # FLD: 0 — SIGNIFICANT CHANGE UP
PHOSPHATE SERPL-MCNC: 2.6 MG/DL — SIGNIFICANT CHANGE UP (ref 2.5–4.5)
PLATELET # BLD AUTO: 409 K/UL — HIGH (ref 150–400)
PMV BLD: 10 FL — SIGNIFICANT CHANGE UP (ref 7–13)
POTASSIUM SERPL-MCNC: 4.3 MMOL/L — SIGNIFICANT CHANGE UP (ref 3.5–5.3)
POTASSIUM SERPL-SCNC: 4.3 MMOL/L — SIGNIFICANT CHANGE UP (ref 3.5–5.3)
PROT SERPL-MCNC: 7.1 G/DL — SIGNIFICANT CHANGE UP (ref 6–8.3)
RBC # BLD: 3.75 M/UL — LOW (ref 4.2–5.8)
RBC # FLD: 13 % — SIGNIFICANT CHANGE UP (ref 10.3–14.5)
SODIUM SERPL-SCNC: 135 MMOL/L — SIGNIFICANT CHANGE UP (ref 135–145)
WBC # BLD: 10.22 K/UL — SIGNIFICANT CHANGE UP (ref 3.8–10.5)
WBC # FLD AUTO: 10.22 K/UL — SIGNIFICANT CHANGE UP (ref 3.8–10.5)

## 2018-08-24 PROCEDURE — 99232 SBSQ HOSP IP/OBS MODERATE 35: CPT

## 2018-08-24 PROCEDURE — 99232 SBSQ HOSP IP/OBS MODERATE 35: CPT | Mod: GC

## 2018-08-24 PROCEDURE — 76705 ECHO EXAM OF ABDOMEN: CPT | Mod: 26

## 2018-08-24 RX ORDER — ERTAPENEM SODIUM 1 G/1
1 INJECTION, POWDER, LYOPHILIZED, FOR SOLUTION INTRAMUSCULAR; INTRAVENOUS
Qty: 1 | Refills: 0 | OUTPATIENT
Start: 2018-08-24 | End: 2018-09-09

## 2018-08-24 RX ADMIN — ENOXAPARIN SODIUM 40 MILLIGRAM(S): 100 INJECTION SUBCUTANEOUS at 06:41

## 2018-08-24 RX ADMIN — Medication 100 MILLIGRAM(S): at 21:48

## 2018-08-24 RX ADMIN — ERTAPENEM SODIUM 120 MILLIGRAM(S): 1 INJECTION, POWDER, LYOPHILIZED, FOR SOLUTION INTRAMUSCULAR; INTRAVENOUS at 17:37

## 2018-08-24 NOTE — PROGRESS NOTE ADULT - ASSESSMENT
68 year old male with past history of Smoking and R pleural effusion requiring chest tube drainage in 2006, who presented to ER with SOB, admitted for left loculated pleural effusion as seen on CT on ertapenem now pending PICC line for long-term abx

## 2018-08-24 NOTE — PROGRESS NOTE ADULT - SUBJECTIVE AND OBJECTIVE BOX
Follow Up:      Inverval History/ROS:Patient is a 68y old  Male who presents with a chief complaint of sob (23 Aug 2018 13:44)    No fever. No evetns.      Allergies    No Known Allergies    Intolerances        ANTIMICROBIALS:  ertapenem  IVPB    ertapenem  IVPB 1000 every 24 hours      OTHER MEDS:  ALBUTerol/ipratropium for Nebulization 3 milliLiter(s) Nebulizer every 6 hours PRN  enoxaparin Injectable 40 milliGRAM(s) SubCutaneous every 24 hours      Vital Signs Last 24 Hrs  T(C): 37 (24 Aug 2018 14:15), Max: 37 (24 Aug 2018 14:15)  T(F): 98.6 (24 Aug 2018 14:15), Max: 98.6 (24 Aug 2018 14:15)  HR: 88 (24 Aug 2018 14:15) (70 - 88)  BP: 103/67 (24 Aug 2018 14:15) (92/64 - 103/67)  BP(mean): --  RR: 18 (24 Aug 2018 14:15) (18 - 18)  SpO2: 98% (24 Aug 2018 14:15) (98% - 98%)    PHYSICAL EXAM:  General: [ x] non-toxic  HEAD/EYES: [ ] PERRL [ x] white sclera [ ] icterus  ENT:  [ ] normal [x ] supple [ ] thrush [ ] pharyngeal exudate  Cardiovascular:   [ ] murmur [ x] normal [ ] PPM/AICD  Respiratory:  [x ] clear to ausculation bilaterally  GI:  [ x] soft, non-tender, normal bowel sounds  :  [ ] grant [ ] no CVA tenderness   Musculoskeletal:  [ x] no synovitis  Neurologic:  [ ] non-focal exam   Skin:  [x ] no rash  Lymph: [ ]x no lymphadenopathy  Psychiatric:  [ x] appropriate affect [ ] alert & oriented  Lines:  [x ] no phlebitis [ ] central line                                11.8   10.22 )-----------( 409      ( 24 Aug 2018 06:45 )             36.1       08-24    135  |  104  |  12  ----------------------------<  105<H>  4.3   |  20<L>  |  0.74    Ca    8.1<L>      24 Aug 2018 06:45  Phos  2.6     08-24  Mg     2.0     08-24    TPro  7.1  /  Alb  2.2<L>  /  TBili  0.6  /  DBili  0.2  /  AST  76<H>  /  ALT  118<H>  /  AlkPhos  70  08-24          MICROBIOLOGY:Culture - Respiratory:   NRF^Normal Respiratory Wendy  QUANTITY OF GROWTH: MODERATE (08-22-18 @ 13:52)      RADIOLOGY:

## 2018-08-24 NOTE — PROGRESS NOTE ADULT - PROBLEM SELECTOR PLAN 4
No evidence of ascites on abd U/S 8/22   - no need for diagnostic tap - pt without GI complaints, tolerating PO, nl BMs, no n/v  - pt with wife who  of liver ca  - ddx include hepatitis vs MERINO vs congestion 2/2 increased right sided pressures in setting of pulm disease  - fu hep serologies, fu RUQ US - pt without GI complaints, tolerating PO, nl BMs, no n/v  - pt with wife who  of liver ca  - ddx include hepatitis vs MERINO vs congestion 2/2 increased right sided pressures in setting of pulm disease  - fu hep serologies, fu RUQ US  - if w/u wnl, will sent alpha 1 antitripsin given pt's sig COPD with minimal smoking hx, may be heterozygous

## 2018-08-24 NOTE — PROGRESS NOTE ADULT - SUBJECTIVE AND OBJECTIVE BOX
CONTACT Olivia Booth MD                                                                                                                                              Internal Medicine PGY-1   [facility] Pager [phone], [facility] Pager [phone]  On weekdays after 7pm and weekends after 12pm, please contact [phone]      Patient is a 68y old  Male who presents with a chief complaint of sob (23 Aug 2018 13:44)      INTERVAL HPI/OVERNIGHT EVENTS:  - No acute events overnight   - Denies CP, SOB, HA, dizziness, Fever, Abd pain, diarrhea or constipation    T(C): 36.9 (08-24-18 @ 06:10), Max: 36.9 (08-23-18 @ 13:50)  HR: 70 (08-24-18 @ 06:10) (70 - 79)  BP: 95/62 (08-24-18 @ 06:10) (92/64 - 106/62)  RR: 18 (08-24-18 @ 06:10) (18 - 18)  SpO2: 98% (08-24-18 @ 06:10) (98% - 98%)    PHYSICAL EXAM:  GENERAL: NAD, well-developed  HEAD:  Atraumatic, Normocephalic  EYES: EOMI, conjunctiva and sclera clear  NECK: Supple, No JVD  NERVOUS SYSTEM:  Alert & Oriented X3, Good concentration  CHEST/LUNG: Normal breathing on RA, +crackles b/l;   HEART: very diminished heart sounds   ABDOMEN: Soft, Nontender, mildly distended; Bowel sounds present, no fluid wave appreciated   EXTREMITIES:  No clubbing, cyanosis, or edema  LYMPH: No lymphadenopathy noted    I&O's Summary    LABS:                        11.8   10.22 )-----------( 409      ( 24 Aug 2018 06:45 )             36.1     08-24    135  |  104  |  12  ----------------------------<  105<H>  4.3   |  20<L>  |  0.74    Ca    8.1<L>      24 Aug 2018 06:45  Phos  2.6     08-24  Mg     2.0     08-24    TPro  7.1  /  Alb  2.2<L>  /  TBili  0.6  /  DBili  0.2  /  AST  76<H>  /  ALT  118<H>  /  AlkPhos  70  08-24        HOSPITAL MEDICATIONS:    MEDICATIONS  (STANDING):  enoxaparin Injectable 40 milliGRAM(s) SubCutaneous every 24 hours  ertapenem  IVPB      ertapenem  IVPB 1000 milliGRAM(s) IV Intermittent every 24 hours      MEDICATIONS  (PRN):  ALBUTerol/ipratropium for Nebulization 3 milliLiter(s) Nebulizer every 6 hours PRN Shortness of Breath and/or Wheezing      HOME MEDICATIONS  Home Medications: CONTACT Olivia Booth MD                                                                                                                                              Internal Medicine PGY-1   [facility] Pager [phone], [facility] Pager [phone]  On weekdays after 7pm and weekends after 12pm, please contact [phone]      Patient is a 68y old  Male who presents with a chief complaint of sob (23 Aug 2018 13:44)      INTERVAL HPI/OVERNIGHT EVENTS:  - No acute events overnight  - Denies CP, HA, dizziness, Fever, Abd pain, N/V, diarrhea or constipation  - complains of dry cough   - PICC line today or tomorrow     T(C): 36.9 (08-24-18 @ 06:10), Max: 36.9 (08-23-18 @ 13:50)  HR: 70 (08-24-18 @ 06:10) (70 - 79)  BP: 95/62 (08-24-18 @ 06:10) (92/64 - 106/62)  RR: 18 (08-24-18 @ 06:10) (18 - 18)  SpO2: 98% (08-24-18 @ 06:10) (98% - 98%)    PHYSICAL EXAM:  GENERAL: NAD, well-developed  HEAD:  Atraumatic, Normocephalic  EYES: EOMI, conjunctiva and sclera clear  NECK: Supple, No JVD  NERVOUS SYSTEM:  Alert & Oriented X3, Good concentration  CHEST/LUNG: Normal breathing on RA, +crackles b/l;   HEART: very diminished heart sounds   ABDOMEN: Soft, Nontender, mildly distended; Bowel sounds present, no fluid wave appreciated   EXTREMITIES:  No clubbing, cyanosis, or edema  LYMPH: No lymphadenopathy noted    I&O's Summary    LABS:                        11.8   10.22 )-----------( 409      ( 24 Aug 2018 06:45 )             36.1     08-24    135  |  104  |  12  ----------------------------<  105<H>  4.3   |  20<L>  |  0.74    Ca    8.1<L>      24 Aug 2018 06:45  Phos  2.6     08-24  Mg     2.0     08-24    TPro  7.1  /  Alb  2.2<L>  /  TBili  0.6  /  DBili  0.2  /  AST  76<H>  /  ALT  118<H>  /  AlkPhos  70  08-24        HOSPITAL MEDICATIONS:    MEDICATIONS  (STANDING):  enoxaparin Injectable 40 milliGRAM(s) SubCutaneous every 24 hours  ertapenem  IVPB      ertapenem  IVPB 1000 milliGRAM(s) IV Intermittent every 24 hours      MEDICATIONS  (PRN):  ALBUTerol/ipratropium for Nebulization 3 milliLiter(s) Nebulizer every 6 hours PRN Shortness of Breath and/or Wheezing      HOME MEDICATIONS  Home Medications:

## 2018-08-24 NOTE — PROGRESS NOTE ADULT - PROBLEM SELECTOR PLAN 2
Resolving - likely hypovolemic hyponatremia given low bp on admission with rapid response to 1L NS bolus, SIADH less likely given urine lytes results  - improved from 129 to 134  - Urinalysis: Na 35, osm 477, cr 62 resolved - likely hypovolemic hyponatremia given low bp on admission with rapid response to 1L NS bolus, SIADH less likely given urine lytes results  - improved from 129 to 134  - Urinalysis: Na 35, osm 477, cr 62

## 2018-08-24 NOTE — PROGRESS NOTE ADULT - ASSESSMENT
68 year old with a prior history of a left sided pleural effusion presents with recent increased shortness or breath/ dyspnea on exertion and leukocytosis with a upper lobe consolidation with loculated effusion.    He presented on Augmentin  The associated leukocytosis suggests an infectious process.  The does seem to have underlying structural lung disease.    Possible pneumonia with parapneumonic effusion but location is atypical.   Imaging raised concern for a fistula.     No plan for drainage at this time.    Continue ertapenem 1 gm iv daily through 9/10/2018  Weekly cbc, bmp fax to [phone]    After abx course follow up with CT surgery for further management and repeat imaging.    No ID contraindication to picc.    Call Id service with additional questions. 68 year old with a prior history of a left sided pleural effusion presents with recent increased shortness or breath/ dyspnea on exertion and leukocytosis with a upper lobe consolidation with loculated effusion.    He presented on Augmentin  The associated leukocytosis suggests an infectious process.  The does seem to have underlying structural lung disease.    Possible pneumonia with loculated fluid     No plan for drainage at this time.    Continue ertapenem 1 gm iv daily through 9/10/2018  Weekly cbc, bmp fax to [phone]    After abx course follow up with CT surgery for further management and repeat imaging.    No ID contraindication to picc.    Call Id service with additional questions.

## 2018-08-24 NOTE — PROGRESS NOTE ADULT - PROBLEM SELECTOR PLAN 1
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   -s/p 1L NS and Vanc/Zosyn loading dose in the ED.   - Improved significantly clinically  - Per ID, abx changed from zosyn to ertapenem. Need PICC line for 3-week total abx course   - Bld cx 8/21 NGTD  - RVP panel neg   - +GPC in pairs in Sputum culture    - Seen CT surgery: no acute intervention for bronchopleural fistula. Can consider lung reduction surgery later   - will contact home pulmonologist Dr. 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   - s/p 1L NS and Vanc/Zosyn loading dose in the ED.   - Improved significantly clinically  - Per ID, abx changed from zosyn to ertapenem. Need PICC line for 3-week total abx course (through 9/10)  - Seen CT surgery: no acute intervention for bronchopleural fistula. Outpatient f/u for further repeat imaging and management (lung reduction) after completing abx course  - Bld cx 8/21 NGTD  - RVP panel neg   - +GPC in pairs in Sputum culture    - will contact home pulmonologist Dr. 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   - s/p 1L NS and Vanc/Zosyn loading dose in the ED.   - Improved significantly clinically  - Per ID, abx changed from zosyn to ertapenem on 8/23.  - PICC line today or tomorrow for 3-week total abx course (through 9/10)  - Seen CT surgery: no acute intervention for bronchopleural fistula. Outpatient f/u for further repeat imaging and management (lung reduction) after completing abx course  - Bld cx 8/21 NGTD  - RVP panel neg   - +GPC in pairs in Sputum culture    - will contact home pulmonologist Dr. Hunt

## 2018-08-25 DIAGNOSIS — J15.9 UNSPECIFIED BACTERIAL PNEUMONIA: ICD-10-CM

## 2018-08-25 LAB
A1AT SERPL-MCNC: 219 MG/DL — HIGH (ref 90–200)
ALBUMIN SERPL ELPH-MCNC: 2.3 G/DL — LOW (ref 3.3–5)
ALP SERPL-CCNC: 67 U/L — SIGNIFICANT CHANGE UP (ref 40–120)
ALT FLD-CCNC: 123 U/L — HIGH (ref 4–41)
AST SERPL-CCNC: 76 U/L — HIGH (ref 4–40)
BILIRUB SERPL-MCNC: 0.5 MG/DL — SIGNIFICANT CHANGE UP (ref 0.2–1.2)
BUN SERPL-MCNC: 10 MG/DL — SIGNIFICANT CHANGE UP (ref 7–23)
CALCIUM SERPL-MCNC: 8.1 MG/DL — LOW (ref 8.4–10.5)
CHLORIDE SERPL-SCNC: 101 MMOL/L — SIGNIFICANT CHANGE UP (ref 98–107)
CO2 SERPL-SCNC: 23 MMOL/L — SIGNIFICANT CHANGE UP (ref 22–31)
CREAT SERPL-MCNC: 0.76 MG/DL — SIGNIFICANT CHANGE UP (ref 0.5–1.3)
GLUCOSE SERPL-MCNC: 96 MG/DL — SIGNIFICANT CHANGE UP (ref 70–99)
MAGNESIUM SERPL-MCNC: 2 MG/DL — SIGNIFICANT CHANGE UP (ref 1.6–2.6)
PHOSPHATE SERPL-MCNC: 2.3 MG/DL — LOW (ref 2.5–4.5)
POTASSIUM SERPL-MCNC: 4.4 MMOL/L — SIGNIFICANT CHANGE UP (ref 3.5–5.3)
POTASSIUM SERPL-SCNC: 4.4 MMOL/L — SIGNIFICANT CHANGE UP (ref 3.5–5.3)
PROT SERPL-MCNC: 7.1 G/DL — SIGNIFICANT CHANGE UP (ref 6–8.3)
SODIUM SERPL-SCNC: 134 MMOL/L — LOW (ref 135–145)

## 2018-08-25 PROCEDURE — 99232 SBSQ HOSP IP/OBS MODERATE 35: CPT | Mod: GC

## 2018-08-25 RX ADMIN — Medication 100 MILLIGRAM(S): at 06:06

## 2018-08-25 RX ADMIN — ENOXAPARIN SODIUM 40 MILLIGRAM(S): 100 INJECTION SUBCUTANEOUS at 06:06

## 2018-08-25 RX ADMIN — ERTAPENEM SODIUM 120 MILLIGRAM(S): 1 INJECTION, POWDER, LYOPHILIZED, FOR SOLUTION INTRAMUSCULAR; INTRAVENOUS at 16:03

## 2018-08-25 RX ADMIN — Medication 100 MILLIGRAM(S): at 13:35

## 2018-08-25 RX ADMIN — Medication 100 MILLIGRAM(S): at 22:54

## 2018-08-25 RX ADMIN — Medication 63.75 MILLIMOLE(S): at 10:13

## 2018-08-25 NOTE — PROGRESS NOTE ADULT - PROBLEM SELECTOR PLAN 3
- Hepatitis panel neg, RUQ US w/ hepatic steatosis, likely component of NAFLD  - if w/u wnl, will sent alpha 1 antitripsin given pt's sig COPD with minimal smoking hx, may be heterozygous - Hepatitis panel neg, RUQ US w/ hepatic steatosis, likely component of NAFLD  - if w/u wnl, sent alpha 1 antitripsin given pt's sig COPD with minimal smoking hx, may be heterozygous

## 2018-08-25 NOTE — PROGRESS NOTE ADULT - ASSESSMENT
68 year old male with past history of Smoking and R pleural effusion requiring chest tube drainage in 2006, who presented to ER with SOB, admitted for L PNA w/ loculation seen on CT on ertapenem now pending PICC line for abx through 9/10.

## 2018-08-25 NOTE — PROGRESS NOTE ADULT - SUBJECTIVE AND OBJECTIVE BOX
Patient is a 68y old  Male who presents with a chief complaint of sob (23 Aug 2018 13:44)        SUBJECTIVE / OVERNIGHT EVENTS: No events overnight. Pt reports feeling well overall. Denies any headache, chest pain, sob, abd, nausea, vomiting, fevers, or chills.       MEDICATIONS  (STANDING):  benzonatate 100 milliGRAM(s) Oral three times a day  enoxaparin Injectable 40 milliGRAM(s) SubCutaneous every 24 hours  ertapenem  IVPB      ertapenem  IVPB 1000 milliGRAM(s) IV Intermittent every 24 hours    MEDICATIONS  (PRN):  ALBUTerol/ipratropium for Nebulization 3 milliLiter(s) Nebulizer every 6 hours PRN Shortness of Breath and/or Wheezing      Vital Signs Last 24 Hrs  T(C): 36.8 (25 Aug 2018 06:05), Max: 37.1 (24 Aug 2018 21:26)  T(F): 98.2 (25 Aug 2018 06:05), Max: 98.8 (24 Aug 2018 21:26)  HR: 70 (25 Aug 2018 06:05) (67 - 88)  BP: 100/69 (25 Aug 2018 06:05) (100/69 - 104/73)  BP(mean): --  RR: 18 (25 Aug 2018 06:05) (18 - 18)  SpO2: 96% (25 Aug 2018 06:05) (96% - 98%)  CAPILLARY BLOOD GLUCOSE        I&O's Summary        PHYSICAL EXAM  GENERAL: NAD, well-developed  HEAD:  Atraumatic, Normocephalic  EYES: EOMI, conjunctiva and sclera clear  NECK: Supple, No JVD  CHEST/LUNG: Decreased breath sounds b/l  HEART: Regular rate and rhythm; No murmurs, rubs, or gallops  ABDOMEN: Soft, Nontender, Nondistended  EXTREMITIES:   No clubbing, cyanosis, or edema  PSYCH: normal affect  SKIN: No rashes or lesions    LABS:                        11.8   10.22 )-----------( 409      ( 24 Aug 2018 06:45 )             36.1     08-25    134<L>  |  101  |  10  ----------------------------<  96  4.4   |  23  |  0.76    Ca    8.1<L>      25 Aug 2018 05:08  Phos  2.3     08-25  Mg     2.0     08-25    TPro  7.1  /  Alb  2.3<L>  /  TBili  0.5  /  DBili  x   /  AST  76<H>  /  ALT  123<H>  /  AlkPhos  67  08-25              RADIOLOGY & ADDITIONAL TESTS:    Imaging Personally Reviewed:  Consultant(s) Notes Reviewed:    Care Discussed with Consultants/Other Providers:

## 2018-08-25 NOTE — PROGRESS NOTE ADULT - PROBLEM SELECTOR PLAN 1
-ID recs appreciated, c/w Ertapenem through 9/10. Pending PICC line.  -CT chest w/ loculations, will need outpatient thoracic surgery follow-up for possible lung reduction surgery

## 2018-08-26 LAB
ALBUMIN SERPL ELPH-MCNC: 2.2 G/DL — LOW (ref 3.3–5)
ALP SERPL-CCNC: 68 U/L — SIGNIFICANT CHANGE UP (ref 40–120)
ALT FLD-CCNC: 124 U/L — HIGH (ref 4–41)
AST SERPL-CCNC: 73 U/L — HIGH (ref 4–40)
BACTERIA BLD CULT: SIGNIFICANT CHANGE UP
BACTERIA BLD CULT: SIGNIFICANT CHANGE UP
BILIRUB SERPL-MCNC: 0.5 MG/DL — SIGNIFICANT CHANGE UP (ref 0.2–1.2)
BUN SERPL-MCNC: 10 MG/DL — SIGNIFICANT CHANGE UP (ref 7–23)
CALCIUM SERPL-MCNC: 8.2 MG/DL — LOW (ref 8.4–10.5)
CHLORIDE SERPL-SCNC: 100 MMOL/L — SIGNIFICANT CHANGE UP (ref 98–107)
CO2 SERPL-SCNC: 20 MMOL/L — LOW (ref 22–31)
CREAT SERPL-MCNC: 0.72 MG/DL — SIGNIFICANT CHANGE UP (ref 0.5–1.3)
GLUCOSE SERPL-MCNC: 94 MG/DL — SIGNIFICANT CHANGE UP (ref 70–99)
MAGNESIUM SERPL-MCNC: 2.1 MG/DL — SIGNIFICANT CHANGE UP (ref 1.6–2.6)
PHOSPHATE SERPL-MCNC: 2.4 MG/DL — LOW (ref 2.5–4.5)
POTASSIUM SERPL-MCNC: 4.5 MMOL/L — SIGNIFICANT CHANGE UP (ref 3.5–5.3)
POTASSIUM SERPL-SCNC: 4.5 MMOL/L — SIGNIFICANT CHANGE UP (ref 3.5–5.3)
PROT SERPL-MCNC: 7.5 G/DL — SIGNIFICANT CHANGE UP (ref 6–8.3)
SODIUM SERPL-SCNC: 132 MMOL/L — LOW (ref 135–145)

## 2018-08-26 RX ADMIN — ERTAPENEM SODIUM 120 MILLIGRAM(S): 1 INJECTION, POWDER, LYOPHILIZED, FOR SOLUTION INTRAMUSCULAR; INTRAVENOUS at 16:39

## 2018-08-26 RX ADMIN — ENOXAPARIN SODIUM 40 MILLIGRAM(S): 100 INJECTION SUBCUTANEOUS at 06:40

## 2018-08-26 RX ADMIN — Medication 100 MILLIGRAM(S): at 13:30

## 2018-08-26 RX ADMIN — Medication 100 MILLIGRAM(S): at 06:39

## 2018-08-26 NOTE — PROGRESS NOTE ADULT - ASSESSMENT
68 year old male with past history of Smoking and R pleural effusion requiring chest tube drainage in 2006, who presented to ER with SOB, admitted for L PNA w/ loculation seen on CT on ertapenem now pending PICC line on  Monday for abx through 9/10.

## 2018-08-26 NOTE — PROGRESS NOTE ADULT - PROBLEM SELECTOR PLAN 3
- Hepatitis panel neg, RUQ US w/ hepatic steatosis, likely component of NAFLD  - alpha 1 antitripsin deficiency unlikely etiology of sig COPD with minimal smoking hx given mildly elevated alpha-1 AT level making sent given pt's sig COPD with minimal smoking hx - Hepatitis panel neg, RUQ US w/ hepatic steatosis, likely component of NAFLD  - alpha 1 antitripsin deficiency unlikely etiology of sig COPD with minimal smoking hx given mildly elevated alpha-1 AT level  - ensure OP fu

## 2018-08-26 NOTE — PROGRESS NOTE ADULT - SUBJECTIVE AND OBJECTIVE BOX
CONTACT Olivia Booth MD                                                                                                                                              Internal Medicine PGY-1   [facility] Pager [phone], [facility] Pager [phone]  On weekdays after 7pm and weekends after 12pm, please contact [phone]      Patient is a 68y old  Male who presents with a chief complaint of sob (23 Aug 2018 13:44)      INTERVAL HPI/OVERNIGHT EVENTS:  - No acute events overnight   - Complaints of SOB 2/2 nasal congestion  - Denies CP, HA, dizziness, Fever, Abd pain, diarrhea or constipation    T(C): 36.8 (08-26-18 @ 06:42), Max: 37.1 (08-25-18 @ 23:04)  HR: 76 (08-26-18 @ 06:42) (72 - 77)  BP: 95/65 (08-26-18 @ 06:42) (95/65 - 101/69)  RR: 18 (08-26-18 @ 06:42) (17 - 18)  SpO2: 97% (08-26-18 @ 06:42) (95% - 97%)    PHYSICAL EXAM:  GENERAL: NAD, well-developed  HEAD:  Atraumatic, Normocephalic  EYES: EOMI, conjunctiva and sclera clear  NECK: Supple, No JVD  NERVOUS SYSTEM:  Alert & Oriented X3, Good concentration  CHEST/LUNG: CTA bilaterally; No crackles or wheezing  HEART: Regular rate and rhythm; No murmurs, rubs, or gallops  ABDOMEN: Soft, Nontender, Nondistended; Bowel sounds present  EXTREMITIES:  No clubbing, cyanosis, or edema  LYMPH: No lymphadenopathy noted  SKIN: No rashes or lesions    I&O's Summary    25 Aug 2018 07:01  -  26 Aug 2018 07:00  --------------------------------------------------------  IN: 350 mL / OUT: 650 mL / NET: -300 mL      LABS:    08-26    132<L>  |  100  |  10  ----------------------------<  94  4.5   |  20<L>  |  0.72    Ca    8.2<L>      26 Aug 2018 06:31  Phos  2.4     08-26  Mg     2.1     08-26    TPro  7.5  /  Alb  2.2<L>  /  TBili  0.5  /  DBili  x   /  AST  73<H>  /  ALT  124<H>  /  AlkPhos  68  08-26      HOSPITAL MEDICATIONS:    MEDICATIONS  (STANDING):  benzonatate 100 milliGRAM(s) Oral three times a day  enoxaparin Injectable 40 milliGRAM(s) SubCutaneous every 24 hours  ertapenem  IVPB      ertapenem  IVPB 1000 milliGRAM(s) IV Intermittent every 24 hours      MEDICATIONS  (PRN):  ALBUTerol/ipratropium for Nebulization 3 milliLiter(s) Nebulizer every 6 hours PRN Shortness of Breath and/or Wheezing      HOME MEDICATIONS  Home Medications:

## 2018-08-26 NOTE — PROGRESS NOTE ADULT - PROBLEM SELECTOR PLAN 1
-ID recs appreciated, c/w Ertapenem through 9/10.   - Pending PICC line tomorrow   -CT chest w/ loculations, will need outpatient thoracic surgery follow-up for possible lung reduction surgery

## 2018-08-27 VITALS
DIASTOLIC BLOOD PRESSURE: 56 MMHG | HEART RATE: 78 BPM | OXYGEN SATURATION: 93 % | RESPIRATION RATE: 17 BRPM | TEMPERATURE: 98 F | SYSTOLIC BLOOD PRESSURE: 88 MMHG

## 2018-08-27 PROCEDURE — 99239 HOSP IP/OBS DSCHRG MGMT >30: CPT

## 2018-08-27 PROCEDURE — 77001 FLUOROGUIDE FOR VEIN DEVICE: CPT | Mod: 26,GC

## 2018-08-27 PROCEDURE — 36569 INSJ PICC 5 YR+ W/O IMAGING: CPT

## 2018-08-27 PROCEDURE — 76937 US GUIDE VASCULAR ACCESS: CPT | Mod: 26

## 2018-08-27 RX ADMIN — ERTAPENEM SODIUM 120 MILLIGRAM(S): 1 INJECTION, POWDER, LYOPHILIZED, FOR SOLUTION INTRAMUSCULAR; INTRAVENOUS at 14:54

## 2018-08-27 RX ADMIN — Medication 200 MILLIGRAM(S): at 07:45

## 2018-08-27 RX ADMIN — ENOXAPARIN SODIUM 40 MILLIGRAM(S): 100 INJECTION SUBCUTANEOUS at 07:46

## 2018-08-27 NOTE — PROGRESS NOTE ADULT - PROBLEM SELECTOR PLAN 3
- Hepatitis panel neg, RUQ US w/ hepatic steatosis, likely component of NAFLD  - alpha 1 antitrypsin deficiency unlikely etiology of sig COPD with minimal smoking hx given mildly elevated alpha-1 AT level  - ensure OP fu

## 2018-08-27 NOTE — PROGRESS NOTE ADULT - PROBLEM SELECTOR PROBLEM 1
Hydropneumothorax
Pneumonia, bacterial

## 2018-08-27 NOTE — PROGRESS NOTE ADULT - PROBLEM SELECTOR PROBLEM 3
Transaminitis
COPD (chronic obstructive pulmonary disease)
Transaminitis
Transaminitis

## 2018-08-27 NOTE — PROGRESS NOTE ADULT - PROBLEM SELECTOR PLAN 1
-ID recs appreciated, c/w Ertapenem through 9/10.   - Plan for d/c home today pending PICC line placement this am   -CT chest w/ loculations, will need outpatient thoracic surgery follow-up for possible lung reduction surgery

## 2018-08-27 NOTE — PROGRESS NOTE ADULT - ASSESSMENT
68 year old male with past history of Smoking and R pleural effusion requiring chest tube drainage in 2006, who presented to ER with SOB, admitted for L PNA w/ loculation seen on CT on ertapenem now pending PICC line today for abx through 9/10.

## 2018-08-27 NOTE — PROGRESS NOTE ADULT - SUBJECTIVE AND OBJECTIVE BOX
CONTACT Olivia Booth MD                                                                                                                                              Internal Medicine PGY-1   [facility] Pager [phone], [facility] Pager [phone]  On weekdays after 7pm and weekends after 12pm, please contact [phone]      Patient is a 68y old  Male who presents with a chief complaint of sob (23 Aug 2018 13:44)      INTERVAL HPI/OVERNIGHT EVENTS:  - No acute events overnight   - No complaints this am   - Denies CP, HA, dizziness, Fever, Abd pain, diarrhea or constipation  - Plan for d/c home pending PICC line this am     T(C): 36.8 (08-26-18 @ 06:42), Max: 37.1 (08-25-18 @ 23:04)  HR: 76 (08-26-18 @ 06:42) (72 - 77)  BP: 95/65 (08-26-18 @ 06:42) (95/65 - 101/69)  RR: 18 (08-26-18 @ 06:42) (17 - 18)  SpO2: 97% (08-26-18 @ 06:42) (95% - 97%)    PHYSICAL EXAM:  GENERAL: NAD, well-developed  HEAD:  Atraumatic, Normocephalic  EYES: EOMI, conjunctiva and sclera clear  NECK: Supple, No JVD  NERVOUS SYSTEM:  Alert & Oriented X3, Good concentration  CHEST/LUNG: diminished lung sounds, dry cough, +fine crackles or wheezing  HEART: diminished heart sounds   ABDOMEN: Soft, Nontender, Nondistended; Bowel sounds present  EXTREMITIES:  No clubbing, cyanosis, or edema  LYMPH: No lymphadenopathy noted  SKIN: No rashes or lesions    I&O's Summary    25 Aug 2018 07:01  -  26 Aug 2018 07:00  --------------------------------------------------------  IN: 350 mL / OUT: 650 mL / NET: -300 mL      LABS:    08-26    132<L>  |  100  |  10  ----------------------------<  94  4.5   |  20<L>  |  0.72    Ca    8.2<L>      26 Aug 2018 06:31  Phos  2.4     08-26  Mg     2.1     08-26    TPro  7.5  /  Alb  2.2<L>  /  TBili  0.5  /  DBili  x   /  AST  73<H>  /  ALT  124<H>  /  AlkPhos  68  08-26      HOSPITAL MEDICATIONS:    MEDICATIONS  (STANDING):  benzonatate 100 milliGRAM(s) Oral three times a day  enoxaparin Injectable 40 milliGRAM(s) SubCutaneous every 24 hours  ertapenem  IVPB      ertapenem  IVPB 1000 milliGRAM(s) IV Intermittent every 24 hours    MEDICATIONS  (PRN):  ALBUTerol/ipratropium for Nebulization 3 milliLiter(s) Nebulizer every 6 hours PRN Shortness of Breath and/or Wheezing

## 2018-08-27 NOTE — PROGRESS NOTE ADULT - PROVIDER SPECIALTY LIST ADULT
Infectious Disease
Internal Medicine
Thoracic Surgery
Internal Medicine

## 2018-08-27 NOTE — CHART NOTE - NSCHARTNOTEFT_GEN_A_CORE
PRE-INTERVENTIONAL RADIOLOGY PROCEDURE NOTE      Patient Age: 68y    Patient Gender: M    Procedure: PICC line    Diagnosis/Indication: Pneumonia/L pleural effusion    Interventional Radiology Attending Physician:     Ordering Attending Physician: Dr. Sargent     Pertinent Medical History:  L pleural effusion    Pertinent labs:    08-26    132<L>  |  100  |  10  ----------------------------<  94  4.5   |  20<L>  |  0.72    Ca    8.2<L>      26 Aug 2018 06:31  Phos  2.4     08-26  Mg     2.1     08-26    TPro  7.5  /  Alb  2.2<L>  /  TBili  0.5  /  DBili  x   /  AST  73<H>  /  ALT  124<H>  /  AlkPhos  68  08-26      Patient and Family Aware ? Yes    Olivia Booth MD  Internal Medicine PGY-1  [facility] Pager [phone], [employer] pager [phone]  On weekdays after 7pm and weekends after 12pm, please contact [phone] PRE-INTERVENTIONAL RADIOLOGY PROCEDURE NOTE      Patient Age: 68y    Patient Gender: M    Procedure: PICC line    Diagnosis/Indication: Pneumonia    Interventional Radiology Attending Physician:     Ordering Attending Physician: Dr. Sargent     Pertinent Medical History:  Multiloculated left hydropneumothorax with multiple air-fluid levels     Pertinent labs:    08-26    132<L>  |  100  |  10  ----------------------------<  94  4.5   |  20<L>  |  0.72    Ca    8.2<L>      26 Aug 2018 06:31  Phos  2.4     08-26  Mg     2.1     08-26    TPro  7.5  /  Alb  2.2<L>  /  TBili  0.5  /  DBili  x   /  AST  73<H>  /  ALT  124<H>  /  AlkPhos  68  08-26      Patient and Family Aware ? Yes    Olivia Booth MD  Internal Medicine PGY-1  [facility] Pager [phone], [facility] pager [phone]  On weekdays after 7pm and weekends after 12pm, please contact [phone]

## 2018-08-27 NOTE — PROGRESS NOTE ADULT - PROBLEM SELECTOR PROBLEM 2
COPD (chronic obstructive pulmonary disease)
Hyponatremia

## 2018-09-11 ENCOUNTER — APPOINTMENT (OUTPATIENT)
Dept: INFECTIOUS DISEASE | Facility: CLINIC | Age: 68
End: 2018-09-11
Payer: MEDICARE

## 2018-09-11 VITALS
HEIGHT: 73 IN | OXYGEN SATURATION: 96 % | TEMPERATURE: 96.9 F | HEART RATE: 107 BPM | DIASTOLIC BLOOD PRESSURE: 62 MMHG | WEIGHT: 170 LBS | BODY MASS INDEX: 22.53 KG/M2 | SYSTOLIC BLOOD PRESSURE: 86 MMHG

## 2018-09-11 DIAGNOSIS — Z87.09 PERSONAL HISTORY OF OTHER DISEASES OF THE RESPIRATORY SYSTEM: ICD-10-CM

## 2018-09-11 DIAGNOSIS — Z87.891 PERSONAL HISTORY OF NICOTINE DEPENDENCE: ICD-10-CM

## 2018-09-11 DIAGNOSIS — Z80.1 FAMILY HISTORY OF MALIGNANT NEOPLASM OF TRACHEA, BRONCHUS AND LUNG: ICD-10-CM

## 2018-09-11 PROCEDURE — 99214 OFFICE O/P EST MOD 30 MIN: CPT

## 2018-09-27 ENCOUNTER — APPOINTMENT (OUTPATIENT)
Dept: THORACIC SURGERY | Facility: CLINIC | Age: 68
End: 2018-09-27
Payer: MEDICARE

## 2018-09-27 VITALS
OXYGEN SATURATION: 96 % | DIASTOLIC BLOOD PRESSURE: 60 MMHG | HEIGHT: 73 IN | TEMPERATURE: 98.1 F | WEIGHT: 174 LBS | RESPIRATION RATE: 18 BRPM | SYSTOLIC BLOOD PRESSURE: 102 MMHG | HEART RATE: 78 BPM | BODY MASS INDEX: 23.06 KG/M2

## 2018-09-27 DIAGNOSIS — Z63.4 DISAPPEARANCE AND DEATH OF FAMILY MEMBER: ICD-10-CM

## 2018-09-27 DIAGNOSIS — Z87.01 PERSONAL HISTORY OF PNEUMONIA (RECURRENT): ICD-10-CM

## 2018-09-27 PROCEDURE — 99214 OFFICE O/P EST MOD 30 MIN: CPT

## 2018-09-27 SDOH — SOCIAL STABILITY - SOCIAL INSECURITY: DISSAPEARANCE AND DEATH OF FAMILY MEMBER: Z63.4

## 2018-09-28 ENCOUNTER — APPOINTMENT (OUTPATIENT)
Dept: CARDIOLOGY | Facility: CLINIC | Age: 68
End: 2018-09-28
Payer: MEDICARE

## 2018-09-28 PROCEDURE — 93306 TTE W/DOPPLER COMPLETE: CPT

## 2018-10-04 ENCOUNTER — APPOINTMENT (OUTPATIENT)
Dept: PULMONOLOGY | Facility: CLINIC | Age: 68
End: 2018-10-04
Payer: MEDICARE

## 2018-10-04 VITALS
OXYGEN SATURATION: 95 % | DIASTOLIC BLOOD PRESSURE: 80 MMHG | HEART RATE: 99 BPM | HEIGHT: 73 IN | BODY MASS INDEX: 23.06 KG/M2 | SYSTOLIC BLOOD PRESSURE: 130 MMHG | WEIGHT: 174 LBS

## 2018-10-04 PROCEDURE — 94618 PULMONARY STRESS TESTING: CPT

## 2018-10-04 PROCEDURE — 94727 GAS DIL/WSHOT DETER LNG VOL: CPT

## 2018-10-04 PROCEDURE — 94729 DIFFUSING CAPACITY: CPT

## 2018-10-04 PROCEDURE — 99205 OFFICE O/P NEW HI 60 MIN: CPT | Mod: 25

## 2018-10-04 PROCEDURE — 94060 EVALUATION OF WHEEZING: CPT | Mod: 59

## 2018-10-08 ENCOUNTER — APPOINTMENT (OUTPATIENT)
Dept: THORACIC SURGERY | Facility: CLINIC | Age: 68
End: 2018-10-08
Payer: MEDICARE

## 2018-10-08 VITALS
SYSTOLIC BLOOD PRESSURE: 109 MMHG | HEIGHT: 73 IN | DIASTOLIC BLOOD PRESSURE: 73 MMHG | TEMPERATURE: 98.2 F | RESPIRATION RATE: 18 BRPM | BODY MASS INDEX: 23.59 KG/M2 | WEIGHT: 178 LBS | HEART RATE: 92 BPM | OXYGEN SATURATION: 96 %

## 2018-10-08 PROCEDURE — 99215 OFFICE O/P EST HI 40 MIN: CPT

## 2018-10-08 RX ORDER — AMOXICILLIN AND CLAVULANATE POTASSIUM 875; 125 MG/1; MG/1
875-125 TABLET, COATED ORAL
Qty: 14 | Refills: 0 | Status: DISCONTINUED | COMMUNITY
Start: 2018-09-11 | End: 2018-10-08

## 2018-10-10 ENCOUNTER — OUTPATIENT (OUTPATIENT)
Dept: OUTPATIENT SERVICES | Facility: HOSPITAL | Age: 68
LOS: 1 days | End: 2018-10-10
Payer: MEDICARE

## 2018-10-10 ENCOUNTER — APPOINTMENT (OUTPATIENT)
Dept: CT IMAGING | Facility: IMAGING CENTER | Age: 68
End: 2018-10-10
Payer: MEDICARE

## 2018-10-10 DIAGNOSIS — Z98.890 OTHER SPECIFIED POSTPROCEDURAL STATES: Chronic | ICD-10-CM

## 2018-10-10 DIAGNOSIS — J18.9 PNEUMONIA, UNSPECIFIED ORGANISM: ICD-10-CM

## 2018-10-10 DIAGNOSIS — J91.8 PLEURAL EFFUSION IN OTHER CONDITIONS CLASSIFIED ELSEWHERE: ICD-10-CM

## 2018-10-10 PROCEDURE — 71250 CT THORAX DX C-: CPT | Mod: 26

## 2018-10-10 PROCEDURE — 71250 CT THORAX DX C-: CPT

## 2018-10-11 ENCOUNTER — OUTPATIENT (OUTPATIENT)
Dept: OUTPATIENT SERVICES | Facility: HOSPITAL | Age: 68
LOS: 1 days | End: 2018-10-11
Payer: MEDICARE

## 2018-10-11 VITALS
TEMPERATURE: 98 F | HEIGHT: 70.5 IN | DIASTOLIC BLOOD PRESSURE: 70 MMHG | HEART RATE: 90 BPM | RESPIRATION RATE: 16 BRPM | OXYGEN SATURATION: 98 % | SYSTOLIC BLOOD PRESSURE: 100 MMHG | WEIGHT: 173.06 LBS

## 2018-10-11 DIAGNOSIS — J43.9 EMPHYSEMA, UNSPECIFIED: ICD-10-CM

## 2018-10-11 DIAGNOSIS — Z98.890 OTHER SPECIFIED POSTPROCEDURAL STATES: Chronic | ICD-10-CM

## 2018-10-11 LAB
BLD GP AB SCN SERPL QL: NEGATIVE — SIGNIFICANT CHANGE UP
BUN SERPL-MCNC: 14 MG/DL — SIGNIFICANT CHANGE UP (ref 7–23)
CALCIUM SERPL-MCNC: 9.3 MG/DL — SIGNIFICANT CHANGE UP (ref 8.4–10.5)
CHLORIDE SERPL-SCNC: 100 MMOL/L — SIGNIFICANT CHANGE UP (ref 98–107)
CO2 SERPL-SCNC: 26 MMOL/L — SIGNIFICANT CHANGE UP (ref 22–31)
CREAT SERPL-MCNC: 0.93 MG/DL — SIGNIFICANT CHANGE UP (ref 0.5–1.3)
GLUCOSE SERPL-MCNC: 96 MG/DL — SIGNIFICANT CHANGE UP (ref 70–99)
HCT VFR BLD CALC: 37.9 % — LOW (ref 39–50)
HGB BLD-MCNC: 12.1 G/DL — LOW (ref 13–17)
MCHC RBC-ENTMCNC: 31.7 PG — SIGNIFICANT CHANGE UP (ref 27–34)
MCHC RBC-ENTMCNC: 31.9 % — LOW (ref 32–36)
MCV RBC AUTO: 99.2 FL — SIGNIFICANT CHANGE UP (ref 80–100)
NRBC # FLD: 0 — SIGNIFICANT CHANGE UP
PLATELET # BLD AUTO: 250 K/UL — SIGNIFICANT CHANGE UP (ref 150–400)
PMV BLD: 11 FL — SIGNIFICANT CHANGE UP (ref 7–13)
POTASSIUM SERPL-MCNC: 4.1 MMOL/L — SIGNIFICANT CHANGE UP (ref 3.5–5.3)
POTASSIUM SERPL-SCNC: 4.1 MMOL/L — SIGNIFICANT CHANGE UP (ref 3.5–5.3)
RBC # BLD: 3.82 M/UL — LOW (ref 4.2–5.8)
RBC # FLD: 16.1 % — HIGH (ref 10.3–14.5)
RH IG SCN BLD-IMP: POSITIVE — SIGNIFICANT CHANGE UP
SODIUM SERPL-SCNC: 137 MMOL/L — SIGNIFICANT CHANGE UP (ref 135–145)
WBC # BLD: 9.67 K/UL — SIGNIFICANT CHANGE UP (ref 3.8–10.5)
WBC # FLD AUTO: 9.67 K/UL — SIGNIFICANT CHANGE UP (ref 3.8–10.5)

## 2018-10-11 PROCEDURE — 93010 ELECTROCARDIOGRAM REPORT: CPT

## 2018-10-11 RX ORDER — SODIUM CHLORIDE 9 MG/ML
3 INJECTION INTRAMUSCULAR; INTRAVENOUS; SUBCUTANEOUS EVERY 8 HOURS
Qty: 0 | Refills: 0 | Status: DISCONTINUED | OUTPATIENT
Start: 2018-10-17 | End: 2018-10-20

## 2018-10-11 RX ORDER — SODIUM CHLORIDE 9 MG/ML
1000 INJECTION, SOLUTION INTRAVENOUS
Qty: 0 | Refills: 0 | Status: DISCONTINUED | OUTPATIENT
Start: 2018-10-17 | End: 2018-10-18

## 2018-10-11 NOTE — H&P PST ADULT - HISTORY OF PRESENT ILLNESS
67yo male with medical h/o Pleural effusion, right lung and Lung bullae, and was admitted at [facility] in 8/2018. , Pt reports he was diagnosed with lung diease in 2006 after many years of tobacco use/smoking. Pt c/o radiating SOB and gets regular pulmonology evaluation. Pt presents today for presurgical testing for Flexible Bronchoscopy, Left Video Assisted Thoracoscopy, Robotic Assisted Left Lobe Bullectomy scheduled for 10/17/2018. 67yo male with medical h/o Pleural effusion, right lung and Lung bullae, reports he was recently admitted at [facility] in 8/2018 for chest pain and SOB. Pt reports he was diagnosed with lung disease in 2006 after many years of tobacco use/smoking. Pt c/o radiating SOB and gets regular pulmonology evaluation. Pt presents today for presurgical testing for Flexible Bronchoscopy, Left Video Assisted Thoracoscopy, Robotic Assisted Left Lobe Bullectomy scheduled for 10/17/2018.

## 2018-10-11 NOTE — H&P PST ADULT - PROBLEM SELECTOR PLAN 1
Flexible Bronchoscopy, Left Video Assisted Thoracoscopy, Robotic Assisted Left Lobe Bullectomy scheduled for 10/17/2018. Flexible Bronchoscopy, Left Video Assisted Thoracoscopy, Robotic Assisted Left Lobe Bullectomy scheduled for 10/17/2018.  Pre-op instructions given. Pt verbalized understanding  Pepcid given for GI prophylaxis  Chlorhexidine wash instructions given  Pending: Medical clearance - SOB and Abnormal EKG (comparison EKG in chart with similar findings)

## 2018-10-11 NOTE — H&P PST ADULT - NSANTHOSAYNRD_GEN_A_CORE
No. NOEL screening performed.  STOP BANG Legend: 0-2 = LOW Risk; 3-4 = INTERMEDIATE Risk; 5-8 = HIGH Risk

## 2018-10-11 NOTE — H&P PST ADULT - MUSCULOSKELETAL
negative detailed exam ROM intact/no joint warmth/no calf tenderness/normal strength/no joint swelling/no joint erythema

## 2018-10-11 NOTE — H&P PST ADULT - DENTITION
has full upper and lower dentures/normal denies loose teeth, has full upper and lower dentures/normal

## 2018-10-16 RX ORDER — HEPARIN SODIUM 5000 [USP'U]/ML
5000 INJECTION INTRAVENOUS; SUBCUTANEOUS ONCE
Qty: 0 | Refills: 0 | Status: COMPLETED | OUTPATIENT
Start: 2018-10-17 | End: 2018-10-17

## 2018-10-17 ENCOUNTER — RESULT REVIEW (OUTPATIENT)
Age: 68
End: 2018-10-17

## 2018-10-17 ENCOUNTER — INPATIENT (INPATIENT)
Facility: HOSPITAL | Age: 68
LOS: 2 days | Discharge: HOME | End: 2018-10-20
Attending: STUDENT IN AN ORGANIZED HEALTH CARE EDUCATION/TRAINING PROGRAM | Admitting: STUDENT IN AN ORGANIZED HEALTH CARE EDUCATION/TRAINING PROGRAM
Payer: MEDICARE

## 2018-10-17 ENCOUNTER — APPOINTMENT (OUTPATIENT)
Dept: THORACIC SURGERY | Facility: HOSPITAL | Age: 68
End: 2018-10-17

## 2018-10-17 VITALS
RESPIRATION RATE: 16 BRPM | TEMPERATURE: 98 F | WEIGHT: 173.06 LBS | SYSTOLIC BLOOD PRESSURE: 114 MMHG | OXYGEN SATURATION: 95 % | DIASTOLIC BLOOD PRESSURE: 82 MMHG | HEART RATE: 82 BPM | HEIGHT: 70.5 IN

## 2018-10-17 DIAGNOSIS — J43.9 EMPHYSEMA, UNSPECIFIED: ICD-10-CM

## 2018-10-17 DIAGNOSIS — Z98.890 OTHER SPECIFIED POSTPROCEDURAL STATES: Chronic | ICD-10-CM

## 2018-10-17 LAB
BASE EXCESS BLDA CALC-SCNC: -0.4 MMOL/L — SIGNIFICANT CHANGE UP
BASE EXCESS BLDA CALC-SCNC: -1 MMOL/L — SIGNIFICANT CHANGE UP
BASE EXCESS BLDA CALC-SCNC: -1.1 MMOL/L — SIGNIFICANT CHANGE UP
CA-I BLDA-SCNC: 1.17 MMOL/L — SIGNIFICANT CHANGE UP (ref 1.15–1.29)
CA-I BLDA-SCNC: 1.2 MMOL/L — SIGNIFICANT CHANGE UP (ref 1.15–1.29)
CA-I BLDA-SCNC: 1.26 MMOL/L — SIGNIFICANT CHANGE UP (ref 1.15–1.29)
GLUCOSE BLDA-MCNC: 184 MG/DL — HIGH (ref 70–99)
GLUCOSE BLDA-MCNC: 204 MG/DL — HIGH (ref 70–99)
GLUCOSE BLDA-MCNC: 215 MG/DL — HIGH (ref 70–99)
HCO3 BLDA-SCNC: 22 MMOL/L — SIGNIFICANT CHANGE UP (ref 22–26)
HCO3 BLDA-SCNC: 23 MMOL/L — SIGNIFICANT CHANGE UP (ref 22–26)
HCO3 BLDA-SCNC: 23 MMOL/L — SIGNIFICANT CHANGE UP (ref 22–26)
HCT VFR BLDA CALC: 35.2 % — LOW (ref 39–51)
HCT VFR BLDA CALC: 35.7 % — LOW (ref 39–51)
HCT VFR BLDA CALC: 36.5 % — LOW (ref 39–51)
HGB BLDA-MCNC: 11.4 G/DL — LOW (ref 13–17)
HGB BLDA-MCNC: 11.6 G/DL — LOW (ref 13–17)
HGB BLDA-MCNC: 11.8 G/DL — LOW (ref 13–17)
PCO2 BLDA: 42 MMHG — SIGNIFICANT CHANGE UP (ref 35–48)
PCO2 BLDA: 57 MMHG — HIGH (ref 35–48)
PCO2 BLDA: 72 MMHG — CRITICAL HIGH (ref 35–48)
PH BLDA: 7.2 PH — CRITICAL LOW (ref 7.35–7.45)
PH BLDA: 7.27 PH — LOW (ref 7.35–7.45)
PH BLDA: 7.36 PH — SIGNIFICANT CHANGE UP (ref 7.35–7.45)
PO2 BLDA: 171 MMHG — HIGH (ref 83–108)
PO2 BLDA: 184 MMHG — HIGH (ref 83–108)
PO2 BLDA: 98 MMHG — SIGNIFICANT CHANGE UP (ref 83–108)
POTASSIUM BLDA-SCNC: 4.2 MMOL/L — SIGNIFICANT CHANGE UP (ref 3.4–4.5)
POTASSIUM BLDA-SCNC: 4.2 MMOL/L — SIGNIFICANT CHANGE UP (ref 3.4–4.5)
POTASSIUM BLDA-SCNC: 4.5 MMOL/L — SIGNIFICANT CHANGE UP (ref 3.4–4.5)
SAO2 % BLDA: 95.4 % — SIGNIFICANT CHANGE UP (ref 95–99)
SAO2 % BLDA: 99.3 % — HIGH (ref 95–99)
SAO2 % BLDA: 99.5 % — HIGH (ref 95–99)
SODIUM BLDA-SCNC: 137 MMOL/L — SIGNIFICANT CHANGE UP (ref 136–146)
SODIUM BLDA-SCNC: 138 MMOL/L — SIGNIFICANT CHANGE UP (ref 136–146)
SODIUM BLDA-SCNC: 138 MMOL/L — SIGNIFICANT CHANGE UP (ref 136–146)

## 2018-10-17 PROCEDURE — 71045 X-RAY EXAM CHEST 1 VIEW: CPT | Mod: 26

## 2018-10-17 PROCEDURE — S2900 ROBOTIC SURGICAL SYSTEM: CPT | Mod: NC

## 2018-10-17 PROCEDURE — 99233 SBSQ HOSP IP/OBS HIGH 50: CPT

## 2018-10-17 PROCEDURE — 32652 THORACOSCOPY REM TOTL CORTEX: CPT | Mod: 22

## 2018-10-17 PROCEDURE — 32672 THORACOSCOPY FOR LVRS: CPT | Mod: AS

## 2018-10-17 PROCEDURE — 32652 THORACOSCOPY REM TOTL CORTEX: CPT | Mod: AS

## 2018-10-17 PROCEDURE — 32650 THORACOSCOPY W/PLEURODESIS: CPT | Mod: AS

## 2018-10-17 PROCEDURE — 88309 TISSUE EXAM BY PATHOLOGIST: CPT | Mod: 26

## 2018-10-17 PROCEDURE — 32650 THORACOSCOPY W/PLEURODESIS: CPT

## 2018-10-17 PROCEDURE — 32672 THORACOSCOPY FOR LVRS: CPT | Mod: 22

## 2018-10-17 RX ORDER — INFLUENZA VIRUS VACCINE 15; 15; 15; 15 UG/.5ML; UG/.5ML; UG/.5ML; UG/.5ML
0.5 SUSPENSION INTRAMUSCULAR ONCE
Qty: 0 | Refills: 0 | Status: DISCONTINUED | OUTPATIENT
Start: 2018-10-17 | End: 2018-10-20

## 2018-10-17 RX ORDER — ALBUMIN HUMAN 25 %
250 VIAL (ML) INTRAVENOUS ONCE
Qty: 0 | Refills: 0 | Status: COMPLETED | OUTPATIENT
Start: 2018-10-17 | End: 2018-10-17

## 2018-10-17 RX ORDER — HEPARIN SODIUM 5000 [USP'U]/ML
5000 INJECTION INTRAVENOUS; SUBCUTANEOUS EVERY 8 HOURS
Qty: 0 | Refills: 0 | Status: DISCONTINUED | OUTPATIENT
Start: 2018-10-17 | End: 2018-10-20

## 2018-10-17 RX ORDER — HYDROMORPHONE HYDROCHLORIDE 2 MG/ML
0.5 INJECTION INTRAMUSCULAR; INTRAVENOUS; SUBCUTANEOUS
Qty: 0 | Refills: 0 | Status: DISCONTINUED | OUTPATIENT
Start: 2018-10-17 | End: 2018-10-18

## 2018-10-17 RX ORDER — GLYCOPYRROLATE AND FORMOTEROL FUMARATE 9; 4.8 UG/1; UG/1
2 AEROSOL, METERED RESPIRATORY (INHALATION)
Qty: 0 | Refills: 0 | COMMUNITY

## 2018-10-17 RX ORDER — ONDANSETRON 8 MG/1
4 TABLET, FILM COATED ORAL EVERY 6 HOURS
Qty: 0 | Refills: 0 | Status: DISCONTINUED | OUTPATIENT
Start: 2018-10-17 | End: 2018-10-20

## 2018-10-17 RX ORDER — NALOXONE HYDROCHLORIDE 4 MG/.1ML
0.1 SPRAY NASAL
Qty: 0 | Refills: 0 | Status: DISCONTINUED | OUTPATIENT
Start: 2018-10-17 | End: 2018-10-18

## 2018-10-17 RX ORDER — GLYCOPYRROLATE AND FORMOTEROL FUMARATE 9; 4.8 UG/1; UG/1
2 AEROSOL, METERED RESPIRATORY (INHALATION)
Qty: 0 | Refills: 0 | Status: DISCONTINUED | OUTPATIENT
Start: 2018-10-17 | End: 2018-10-20

## 2018-10-17 RX ORDER — TOBRAMYCIN 0.3 %
1 DROPS OPHTHALMIC (EYE)
Qty: 0 | Refills: 0 | Status: DISCONTINUED | OUTPATIENT
Start: 2018-10-17 | End: 2018-10-20

## 2018-10-17 RX ORDER — HYDROMORPHONE HYDROCHLORIDE 2 MG/ML
30 INJECTION INTRAMUSCULAR; INTRAVENOUS; SUBCUTANEOUS
Qty: 0 | Refills: 0 | Status: DISCONTINUED | OUTPATIENT
Start: 2018-10-17 | End: 2018-10-18

## 2018-10-17 RX ADMIN — Medication 1 DROP(S): at 23:51

## 2018-10-17 RX ADMIN — HEPARIN SODIUM 5000 UNIT(S): 5000 INJECTION INTRAVENOUS; SUBCUTANEOUS at 21:54

## 2018-10-17 RX ADMIN — SODIUM CHLORIDE 3 MILLILITER(S): 9 INJECTION INTRAMUSCULAR; INTRAVENOUS; SUBCUTANEOUS at 15:44

## 2018-10-17 RX ADMIN — SODIUM CHLORIDE 3 MILLILITER(S): 9 INJECTION INTRAMUSCULAR; INTRAVENOUS; SUBCUTANEOUS at 21:53

## 2018-10-17 RX ADMIN — HYDROMORPHONE HYDROCHLORIDE 30 MILLILITER(S): 2 INJECTION INTRAMUSCULAR; INTRAVENOUS; SUBCUTANEOUS at 17:59

## 2018-10-17 RX ADMIN — SODIUM CHLORIDE 50 MILLILITER(S): 9 INJECTION, SOLUTION INTRAVENOUS at 17:59

## 2018-10-17 RX ADMIN — SODIUM CHLORIDE 30 MILLILITER(S): 9 INJECTION, SOLUTION INTRAVENOUS at 07:27

## 2018-10-17 RX ADMIN — SODIUM CHLORIDE 50 MILLILITER(S): 9 INJECTION, SOLUTION INTRAVENOUS at 19:05

## 2018-10-17 RX ADMIN — Medication 500 MILLILITER(S): at 19:04

## 2018-10-17 RX ADMIN — HYDROMORPHONE HYDROCHLORIDE 30 MILLILITER(S): 2 INJECTION INTRAMUSCULAR; INTRAVENOUS; SUBCUTANEOUS at 19:03

## 2018-10-17 RX ADMIN — HEPARIN SODIUM 5000 UNIT(S): 5000 INJECTION INTRAVENOUS; SUBCUTANEOUS at 07:27

## 2018-10-17 RX ADMIN — GLYCOPYRROLATE AND FORMOTEROL FUMARATE 2 PUFF(S): 9; 4.8 AEROSOL, METERED RESPIRATORY (INHALATION) at 21:55

## 2018-10-17 NOTE — BRIEF OPERATIVE NOTE - OPERATION/FINDINGS
Flexible bronchoscopy, robotic assisted left sided VATS, pneumolysis, lung volume reduction of left upper lobe, mechanical pleurodesis

## 2018-10-17 NOTE — PROGRESS NOTE ADULT - SUBJECTIVE AND OBJECTIVE BOX
SINA HANLEY                     [id number]    HPI:  67yo male with medical h/o Pleural effusion, right lung and Lung bullae, reports he was recently admitted at [facility] in 8/2018 for chest pain and SOB. Pt reports he was diagnosed with lung disease in 2006 after many years of tobacco use/smoking. Pt c/o radiating SOB and gets regular pulmonology evaluation. Pt presents today for presurgical testing for Flexible Bronchoscopy, Left Video Assisted Thoracoscopy, Robotic Assisted Left Lobe Bullectomy scheduled for 10/17/2018. (11 Oct 2018 14:50)      Procedure: Lung volume reduction surgery, thoracoscopic  10/17/2018  robotic assisted Pleurodesis, mechanical     Issues:  Bulla of manuel  Post op pain  Pleural effusion  Smoker      PAST MEDICAL & SURGICAL HISTORY:  Pleural effusion  Smoker  H/O chest tube placement            VITAL SIGNS:  Vital Signs Last 24 Hrs  T(C): 36.6 (17 Oct 2018 16:00), Max: 36.9 (17 Oct 2018 06:45)  T(F): 97.9 (17 Oct 2018 16:00), Max: 98.4 (17 Oct 2018 06:45)  HR: 61 (17 Oct 2018 16:30) (61 - 82)  BP: 96/57 (17 Oct 2018 14:40) (96/57 - 114/82)  BP(mean): 66 (17 Oct 2018 14:40) (66 - 66)  RR: 16 (17 Oct 2018 16:30) (10 - 25)  SpO2: 100% (17 Oct 2018 16:30) (91% - 100%)    I/Os:   I&O's Detail    17 Oct 2018 07:01  -  17 Oct 2018 17:50  --------------------------------------------------------  IN:    lactated ringers.: 130 mL  Total IN: 130 mL    OUT:    Chest Tube: 60 mL    Indwelling Catheter - Urethral: 135 mL  Total OUT: 195 mL    Total NET: -65 mL          CAPILLARY BLOOD GLUCOSE          =======================MEDICATIONS===================  MEDICATIONS  (STANDING):  glycopyrrolate 9 MICROgram(s)/formoterol 4.8 MICROgram(s)Inhaler 2 Puff(s) Inhalation two times a day  heparin  Injectable 5000 Unit(s) SubCutaneous every 8 hours  HYDROmorphone PCA (1 mG/mL) 30 milliLiter(s) PCA Continuous PCA Continuous  influenza   Vaccine 0.5 milliLiter(s) IntraMuscular once  lactated ringers. 1000 milliLiter(s) (50 mL/Hr) IV Continuous <Continuous>  sodium chloride 0.9% lock flush 3 milliLiter(s) IV Push every 8 hours    MEDICATIONS  (PRN):  HYDROmorphone PCA (1 mG/mL) Rescue Clinician Bolus 0.5 milliGRAM(s) IV Push every 15 minutes PRN for Pain Scale GREATER THAN 6  naloxone Injectable 0.1 milliGRAM(s) IV Push every 3 minutes PRN For ANY of the following changes in patient status:  A. RR LESS THAN 10 breaths per minute, B. Oxygen saturation LESS THAN 90%, C. Sedation score of 6  ondansetron Injectable 4 milliGRAM(s) IV Push every 6 hours PRN Nausea        PHYSICAL EXAM============================  General:                         Awake, alert, not in any distress  Neuro:                            Moving all extremities to commands.   Respiratory:	Air entry fair and  bilateral conducted sounds                                           Effort even and unlabored.  CV:		Regular rate and rhythm. Normal S1/S2                                          Distal pulses present.  Abdomen:	                     Soft, non-distended. Bowel sounds present   Skin:		No rash.  Extremities:	Warm, no cyanosis or edema.  Palpable pulses    ============================LABS=========================    ABG - ( 17 Oct 2018 14:06 )  pH, Arterial: 7.36  pH, Blood: x     /  pCO2: 42    /  pO2: 171   / HCO3: 23    / Base Excess: -1.1  /  SaO2: 99.5              =============================NEUROLOGY============================  Pain control with PCA /   Tylenol IV /     ==============================RESPIRATORY========================  Pt is on  2   L nasal canula   Comfortable, not in any distress.  Using incentive spirometry   Monitor chest tube output  Chest tube to suction 	  Continue bronchodilators, pulmonary toilet    ============================CARDIOVASCULAR======================  Continue hemodynamic monitoring.  Not on any pressors    =====================RENAL===================  Continue LR 30CC/hr    Monitor I/Os and electrolytes    ====================GASTROINTESTINAL===================  On clears, tolerating  Continue GI prophylaxis with  Protonix  Continue Zofran / Reglan for nausea - PRN	    ========================HEMATOLOGIC/ONCOLOGIC====================  Monitor chest tube output. No signs of active bleeding.   Follow CBC in AM    ============================INFECTIOUS DISEASE========================  Monitor for fever / leukocytosis.  All surgical incision / chest tube  sites look clean      Pt is on GI & DVT prophylaxis  OOB & ambulate       Pertinent clinical, laboratory, radiographic, hemodynamic, echocardiographic, respiratory data, microbiologic data and chart were reviewed and analyzed frequently throughout the course of the day and night  Patient seen, examined and plan discussed with CT Surgery / CTICU team during rounds.    Pt's status discussed with family at bedside, updated status        Dane Bright DO FACEP

## 2018-10-18 LAB
ALBUMIN SERPL ELPH-MCNC: 3.3 G/DL — SIGNIFICANT CHANGE UP (ref 3.3–5)
ALP SERPL-CCNC: 38 U/L — LOW (ref 40–120)
ALT FLD-CCNC: 18 U/L — SIGNIFICANT CHANGE UP (ref 4–41)
AST SERPL-CCNC: 24 U/L — SIGNIFICANT CHANGE UP (ref 4–40)
BASOPHILS # BLD AUTO: 0.01 K/UL — SIGNIFICANT CHANGE UP (ref 0–0.2)
BASOPHILS NFR BLD AUTO: 0.1 % — SIGNIFICANT CHANGE UP (ref 0–2)
BILIRUB SERPL-MCNC: 0.6 MG/DL — SIGNIFICANT CHANGE UP (ref 0.2–1.2)
BUN SERPL-MCNC: 13 MG/DL — SIGNIFICANT CHANGE UP (ref 7–23)
CALCIUM SERPL-MCNC: 8.3 MG/DL — LOW (ref 8.4–10.5)
CHLORIDE SERPL-SCNC: 104 MMOL/L — SIGNIFICANT CHANGE UP (ref 98–107)
CO2 SERPL-SCNC: 24 MMOL/L — SIGNIFICANT CHANGE UP (ref 22–31)
CREAT SERPL-MCNC: 0.78 MG/DL — SIGNIFICANT CHANGE UP (ref 0.5–1.3)
EOSINOPHIL # BLD AUTO: 0 K/UL — SIGNIFICANT CHANGE UP (ref 0–0.5)
EOSINOPHIL NFR BLD AUTO: 0 % — SIGNIFICANT CHANGE UP (ref 0–6)
GLUCOSE SERPL-MCNC: 111 MG/DL — HIGH (ref 70–99)
HCT VFR BLD CALC: 29.8 % — LOW (ref 39–50)
HGB BLD-MCNC: 9.7 G/DL — LOW (ref 13–17)
IMM GRANULOCYTES # BLD AUTO: 0.04 # — SIGNIFICANT CHANGE UP
IMM GRANULOCYTES NFR BLD AUTO: 0.5 % — SIGNIFICANT CHANGE UP (ref 0–1.5)
LYMPHOCYTES # BLD AUTO: 1.24 K/UL — SIGNIFICANT CHANGE UP (ref 1–3.3)
LYMPHOCYTES # BLD AUTO: 14.9 % — SIGNIFICANT CHANGE UP (ref 13–44)
MCHC RBC-ENTMCNC: 31.8 PG — SIGNIFICANT CHANGE UP (ref 27–34)
MCHC RBC-ENTMCNC: 32.6 % — SIGNIFICANT CHANGE UP (ref 32–36)
MCV RBC AUTO: 97.7 FL — SIGNIFICANT CHANGE UP (ref 80–100)
MONOCYTES # BLD AUTO: 0.79 K/UL — SIGNIFICANT CHANGE UP (ref 0–0.9)
MONOCYTES NFR BLD AUTO: 9.5 % — SIGNIFICANT CHANGE UP (ref 2–14)
NEUTROPHILS # BLD AUTO: 6.24 K/UL — SIGNIFICANT CHANGE UP (ref 1.8–7.4)
NEUTROPHILS NFR BLD AUTO: 75 % — SIGNIFICANT CHANGE UP (ref 43–77)
NRBC # FLD: 0 — SIGNIFICANT CHANGE UP
PLATELET # BLD AUTO: 196 K/UL — SIGNIFICANT CHANGE UP (ref 150–400)
PMV BLD: 10.8 FL — SIGNIFICANT CHANGE UP (ref 7–13)
POTASSIUM SERPL-MCNC: 3.9 MMOL/L — SIGNIFICANT CHANGE UP (ref 3.5–5.3)
POTASSIUM SERPL-SCNC: 3.9 MMOL/L — SIGNIFICANT CHANGE UP (ref 3.5–5.3)
PROT SERPL-MCNC: 7.6 G/DL — SIGNIFICANT CHANGE UP (ref 6–8.3)
RBC # BLD: 3.05 M/UL — LOW (ref 4.2–5.8)
RBC # FLD: 15.8 % — HIGH (ref 10.3–14.5)
SODIUM SERPL-SCNC: 140 MMOL/L — SIGNIFICANT CHANGE UP (ref 135–145)
WBC # BLD: 8.32 K/UL — SIGNIFICANT CHANGE UP (ref 3.8–10.5)
WBC # FLD AUTO: 8.32 K/UL — SIGNIFICANT CHANGE UP (ref 3.8–10.5)

## 2018-10-18 PROCEDURE — 99233 SBSQ HOSP IP/OBS HIGH 50: CPT

## 2018-10-18 PROCEDURE — 71045 X-RAY EXAM CHEST 1 VIEW: CPT | Mod: 26

## 2018-10-18 RX ORDER — OXYCODONE AND ACETAMINOPHEN 5; 325 MG/1; MG/1
1 TABLET ORAL EVERY 6 HOURS
Qty: 0 | Refills: 0 | Status: DISCONTINUED | OUTPATIENT
Start: 2018-10-18 | End: 2018-10-20

## 2018-10-18 RX ORDER — OXYCODONE AND ACETAMINOPHEN 5; 325 MG/1; MG/1
2 TABLET ORAL EVERY 6 HOURS
Qty: 0 | Refills: 0 | Status: DISCONTINUED | OUTPATIENT
Start: 2018-10-18 | End: 2018-10-20

## 2018-10-18 RX ORDER — ACETAMINOPHEN 500 MG
650 TABLET ORAL EVERY 6 HOURS
Qty: 0 | Refills: 0 | Status: DISCONTINUED | OUTPATIENT
Start: 2018-10-18 | End: 2018-10-20

## 2018-10-18 RX ORDER — CALCIUM GLUCONATE 100 MG/ML
1 VIAL (ML) INTRAVENOUS ONCE
Qty: 0 | Refills: 0 | Status: COMPLETED | OUTPATIENT
Start: 2018-10-18 | End: 2018-10-18

## 2018-10-18 RX ORDER — MAGNESIUM SULFATE 500 MG/ML
1 VIAL (ML) INJECTION ONCE
Qty: 0 | Refills: 0 | Status: COMPLETED | OUTPATIENT
Start: 2018-10-18 | End: 2018-10-18

## 2018-10-18 RX ORDER — ONDANSETRON 8 MG/1
4 TABLET, FILM COATED ORAL EVERY 6 HOURS
Qty: 0 | Refills: 0 | Status: DISCONTINUED | OUTPATIENT
Start: 2018-10-18 | End: 2018-10-20

## 2018-10-18 RX ORDER — IPRATROPIUM/ALBUTEROL SULFATE 18-103MCG
3 AEROSOL WITH ADAPTER (GRAM) INHALATION EVERY 6 HOURS
Qty: 0 | Refills: 0 | Status: DISCONTINUED | OUTPATIENT
Start: 2018-10-18 | End: 2018-10-20

## 2018-10-18 RX ORDER — SODIUM CHLORIDE 9 MG/ML
250 INJECTION INTRAMUSCULAR; INTRAVENOUS; SUBCUTANEOUS ONCE
Qty: 0 | Refills: 0 | Status: COMPLETED | OUTPATIENT
Start: 2018-10-18 | End: 2018-10-18

## 2018-10-18 RX ADMIN — HEPARIN SODIUM 5000 UNIT(S): 5000 INJECTION INTRAVENOUS; SUBCUTANEOUS at 06:38

## 2018-10-18 RX ADMIN — Medication 650 MILLIGRAM(S): at 15:30

## 2018-10-18 RX ADMIN — SODIUM CHLORIDE 250 MILLILITER(S): 9 INJECTION INTRAMUSCULAR; INTRAVENOUS; SUBCUTANEOUS at 18:16

## 2018-10-18 RX ADMIN — Medication 3 MILLILITER(S): at 21:20

## 2018-10-18 RX ADMIN — SODIUM CHLORIDE 3 MILLILITER(S): 9 INJECTION INTRAMUSCULAR; INTRAVENOUS; SUBCUTANEOUS at 13:14

## 2018-10-18 RX ADMIN — HEPARIN SODIUM 5000 UNIT(S): 5000 INJECTION INTRAVENOUS; SUBCUTANEOUS at 23:03

## 2018-10-18 RX ADMIN — Medication 100 GRAM(S): at 20:00

## 2018-10-18 RX ADMIN — Medication 200 GRAM(S): at 20:51

## 2018-10-18 RX ADMIN — HYDROMORPHONE HYDROCHLORIDE 30 MILLILITER(S): 2 INJECTION INTRAMUSCULAR; INTRAVENOUS; SUBCUTANEOUS at 07:04

## 2018-10-18 RX ADMIN — Medication 1 DROP(S): at 06:39

## 2018-10-18 RX ADMIN — Medication 3 MILLILITER(S): at 15:45

## 2018-10-18 RX ADMIN — GLYCOPYRROLATE AND FORMOTEROL FUMARATE 2 PUFF(S): 9; 4.8 AEROSOL, METERED RESPIRATORY (INHALATION) at 21:25

## 2018-10-18 RX ADMIN — Medication 650 MILLIGRAM(S): at 16:00

## 2018-10-18 RX ADMIN — SODIUM CHLORIDE 3 MILLILITER(S): 9 INJECTION INTRAMUSCULAR; INTRAVENOUS; SUBCUTANEOUS at 23:03

## 2018-10-18 RX ADMIN — GLYCOPYRROLATE AND FORMOTEROL FUMARATE 2 PUFF(S): 9; 4.8 AEROSOL, METERED RESPIRATORY (INHALATION) at 09:00

## 2018-10-18 RX ADMIN — HEPARIN SODIUM 5000 UNIT(S): 5000 INJECTION INTRAVENOUS; SUBCUTANEOUS at 13:13

## 2018-10-18 RX ADMIN — Medication 1 DROP(S): at 13:13

## 2018-10-18 RX ADMIN — SODIUM CHLORIDE 3 MILLILITER(S): 9 INJECTION INTRAMUSCULAR; INTRAVENOUS; SUBCUTANEOUS at 06:40

## 2018-10-18 RX ADMIN — Medication 1 DROP(S): at 17:23

## 2018-10-18 RX ADMIN — Medication 3 MILLILITER(S): at 09:00

## 2018-10-18 NOTE — PROGRESS NOTE ADULT - SUBJECTIVE AND OBJECTIVE BOX
SINA HANLEY            [id number]         No Known Allergies                 HPI:  69yo male with medical h/o Pleural effusion, right lung and Lung bullae, reports he was recently admitted at [facility] in 8/2018 for chest pain and SOB. Pt reports he was diagnosed with lung disease in 2006 after many years of tobacco use/smoking. Pt c/o radiating SOB and gets regular pulmonology evaluation. Pt presents today for presurgical testing for Flexible Bronchoscopy, Left Video Assisted Thoracoscopy, Robotic Assisted Left Lobe Bullectomy scheduled for 10/17/2018. (11 Oct 2018 14:50)      Procedure: Flexible bronchoscopy, robotic assisted left sided VATS, pneumolysis, lung volume reduction of left upper lobe, mechanical pleurodesis  10/17/2018       Issues:  Lung bullae  Pleural effusion  Postop pain               Home Medications:  Bevespi Aerosphere 9 mcg-4.8 mcg/inh inhalation aerosol: 2 puff(s) inhaled 2 times a day (17 Oct 2018 07:03)      PAST MEDICAL & SURGICAL HISTORY:  Pleural effusion  Smoker  H/O chest tube placement        ICU Vital Signs Last 24 Hrs  T(C): 36.7 (18 Oct 2018 20:00), Max: 37.1 (18 Oct 2018 04:00)  T(F): 98.1 (18 Oct 2018 20:00), Max: 98.8 (18 Oct 2018 04:00)  HR: 66 (18 Oct 2018 21:25) (60 - 80)  BP: 90/52 (18 Oct 2018 20:00) (84/47 - 107/60)  BP(mean): 61 (18 Oct 2018 20:00) (56 - 71)  ABP: 102/56 (18 Oct 2018 04:00) (69/57 - 102/56)  ABP(mean): 75 (18 Oct 2018 04:00) (63 - 75)  RR: 26 (18 Oct 2018 20:00) (15 - 29)  SpO2: 100% (18 Oct 2018 21:25) (92% - 100%)    I&O's Detail    17 Oct 2018 07:01  -  18 Oct 2018 07:00  --------------------------------------------------------  IN:    IV PiggyBack: 500 mL    lactated ringers.: 830 mL  Total IN: 1330 mL    OUT:    Chest Tube: 38 mL    Chest Tube: 345 mL    Indwelling Catheter - Urethral: 1370 mL  Total OUT: 1753 mL    Total NET: -423 mL      18 Oct 2018 07:01  -  18 Oct 2018 23:02  --------------------------------------------------------  IN:    IV PiggyBack: 50 mL    lactated ringers.: 450 mL    Oral Fluid: 240 mL  Total IN: 740 mL    OUT:    Chest Tube: 10 mL    Chest Tube: 70 mL    Voided: 200 mL  Total OUT: 280 mL    Total NET: 460 mL        CAPILLARY BLOOD GLUCOSE          Home Medications:  Bevespi Aerosphere 9 mcg-4.8 mcg/inh inhalation aerosol: 2 puff(s) inhaled 2 times a day (17 Oct 2018 07:03)      MEDICATIONS  (STANDING):  ALBUTerol/ipratropium for Nebulization 3 milliLiter(s) Nebulizer every 6 hours  glycopyrrolate 9 MICROgram(s)/formoterol 4.8 MICROgram(s)Inhaler 2 Puff(s) Inhalation two times a day  heparin  Injectable 5000 Unit(s) SubCutaneous every 8 hours  influenza   Vaccine 0.5 milliLiter(s) IntraMuscular once  sodium chloride 0.9% lock flush 3 milliLiter(s) IV Push every 8 hours  tobramycin 0.3% Ophthalmic Solution 1 Drop(s) Right EYE four times a day    MEDICATIONS  (PRN):  acetaminophen   Tablet .. 650 milliGRAM(s) Oral every 6 hours PRN Mild Pain (1 - 3)  ondansetron Injectable 4 milliGRAM(s) IV Push every 6 hours PRN Nausea  ondansetron Injectable 4 milliGRAM(s) IV Push every 6 hours PRN Nausea and/or Vomiting  oxyCODONE    5 mG/acetaminophen 325 mG 1 Tablet(s) Oral every 6 hours PRN Moderate Pain (4 - 6)  oxyCODONE    5 mG/acetaminophen 325 mG 2 Tablet(s) Oral every 6 hours PRN Severe Pain (7 - 10)          Physical exam:                             General:               Pt is awake, alert,  not in any distress                                                  Neuro:                  Nonfocal                             Cardiovascular:   S1 & S2, regular                           Respiratory:         Air entry is fair and equal on both sides, has bilateral conducted sounds                           GI:                          Soft, nondistended and nontender, Bowel sounds active                            Ext:                        No cyanosis or edema     Labs:                                                                           9.7    8.32  )-----------( 196      ( 18 Oct 2018 04:40 )             29.8             10-18    140  |  104  |  13  ----------------------------<  111<H>  3.9   |  24  |  0.78    Ca    8.3<L>      18 Oct 2018 04:40    TPro  7.6  /  Alb  3.3  /  TBili  0.6  /  DBili  x   /  AST  24  /  ALT  18  /  AlkPhos  38<L>  10-18                    LIVER FUNCTIONS - ( 18 Oct 2018 04:40 )  Alb: 3.3 g/dL / Pro: 7.6 g/dL / ALK PHOS: 38 u/L / ALT: 18 u/L / AST: 24 u/L / GGT: x             CXR:    < from: Xray Chest 1 View- PORTABLE-Routine (10.18.18 @ 06:26) >  2 left-sided chest tubes is seen. Surgical clips overlie the left upper   lobe where a rounded opacity is present silhouetting the aortic arch   replacing the loculated pneumothorax or large bullae seen on prior CT   chest. Loss of volume in this lung. Right lung shows no focal   abnormalities.    October 18 at 5:49 AM:  2 left-sided chest tubes unchanged. Loss of volume in the left lung with   postsurgical changes in the left apex. No effusions or pneumothorax.          Plan:    General: 69yo male with medical h/o Pleural effusion, right lung and Lung bullae, reports he was recently admitted at [facility] in 8/2018 for chest pain and SOB. Pt reports he was diagnosed with lung disease in 2006 after many years of tobacco use/smoking. Pt c/o radiating SOB and gets regular pulmonology evaluation. Pt presents today for presurgical testing for Flexible Bronchoscopy, Left Video Assisted Thoracoscopy, Robotic Assisted Left Lobe Bullectomy scheduled for 10/17/2018. (11 Oct 2018 14:50)                          Neuro:                                        Pain control with Tylenol / Percocet                            Cardiovascular:                                          Continue hemodynamic monitoring.                            Respiratory:                                         Pt is on RA                                         Comfortable, not in any distress.                                         Encourage incentive spirometry                                          Monitor chest tube output                                         Chest tube to SUCTION, no air leak                                                                  Continue bronchodilators, pulmonary toilet                            GI                                         On regular diet                                          Continue Zofran / Reglan for nausea - PRN	                                                                 Renal:                                         Monitor I/Os and electrolytes                                                                                        Hem/ Onc:                                                                                  Monitor chest tube output &  signs of bleeding.                                          Follow CBC in AM                           Infectious disease:                                            No signs of infection. Monitor for fever / leukocytosis.                                          All surgical incision / chest tube  sites look clean                            Endocrine                                           Continue Accu-Checks with coverage    Pt is on SQ Heparin and Venodyne boots for DVT prophylaxis.     Pertinent clinical, laboratory, radiographic, hemodynamic, echocardiographic, respiratory data, microbiologic data and chart were reviewed and analyzed frequently throughout the course of the day and night  Patient seen, examined and plan discussed with CT Surgeon Dr. Aguilar / CTICU team during rounds.    Pt's status discussed with family at bedside, updated status.             Omid Joshi MD SINA HANLEY            [id number]         No Known Allergies                 HPI:  67yo male with medical h/o Pleural effusion, right lung and Lung bullae, reports he was recently admitted at [facility] in 8/2018 for chest pain and SOB. Pt reports he was diagnosed with lung disease in 2006 after many years of tobacco use/smoking. Pt c/o radiating SOB and gets regular pulmonology evaluation. Pt presents today for presurgical testing for Flexible Bronchoscopy, Left Video Assisted Thoracoscopy, Robotic Assisted Left Lobe Bullectomy scheduled for 10/17/2018. (11 Oct 2018 14:50)      Procedure: Flexible bronchoscopy, robotic assisted left sided VATS, pneumolysis, lung volume reduction of left upper lobe, mechanical pleurodesis  10/17/2018       Issues:  Lung bullae  COPD  Postop pain               Home Medications:  Bevespi Aerosphere 9 mcg-4.8 mcg/inh inhalation aerosol: 2 puff(s) inhaled 2 times a day (17 Oct 2018 07:03)      PAST MEDICAL & SURGICAL HISTORY:  Pleural effusion  Smoker  H/O chest tube placement        ICU Vital Signs Last 24 Hrs  T(C): 36.7 (18 Oct 2018 20:00), Max: 37.1 (18 Oct 2018 04:00)  T(F): 98.1 (18 Oct 2018 20:00), Max: 98.8 (18 Oct 2018 04:00)  HR: 66 (18 Oct 2018 21:25) (60 - 80)  BP: 90/52 (18 Oct 2018 20:00) (84/47 - 107/60)  BP(mean): 61 (18 Oct 2018 20:00) (56 - 71)  ABP: 102/56 (18 Oct 2018 04:00) (69/57 - 102/56)  ABP(mean): 75 (18 Oct 2018 04:00) (63 - 75)  RR: 26 (18 Oct 2018 20:00) (15 - 29)  SpO2: 100% (18 Oct 2018 21:25) (92% - 100%)    I&O's Detail    17 Oct 2018 07:01  -  18 Oct 2018 07:00  --------------------------------------------------------  IN:    IV PiggyBack: 500 mL    lactated ringers.: 830 mL  Total IN: 1330 mL    OUT:    Chest Tube: 38 mL    Chest Tube: 345 mL    Indwelling Catheter - Urethral: 1370 mL  Total OUT: 1753 mL    Total NET: -423 mL      18 Oct 2018 07:01  -  18 Oct 2018 23:02  --------------------------------------------------------  IN:    IV PiggyBack: 50 mL    lactated ringers.: 450 mL    Oral Fluid: 240 mL  Total IN: 740 mL    OUT:    Chest Tube: 10 mL    Chest Tube: 70 mL    Voided: 200 mL  Total OUT: 280 mL    Total NET: 460 mL        CAPILLARY BLOOD GLUCOSE          Home Medications:  Bevespi Aerosphere 9 mcg-4.8 mcg/inh inhalation aerosol: 2 puff(s) inhaled 2 times a day (17 Oct 2018 07:03)      MEDICATIONS  (STANDING):  ALBUTerol/ipratropium for Nebulization 3 milliLiter(s) Nebulizer every 6 hours  glycopyrrolate 9 MICROgram(s)/formoterol 4.8 MICROgram(s)Inhaler 2 Puff(s) Inhalation two times a day  heparin  Injectable 5000 Unit(s) SubCutaneous every 8 hours  influenza   Vaccine 0.5 milliLiter(s) IntraMuscular once  sodium chloride 0.9% lock flush 3 milliLiter(s) IV Push every 8 hours  tobramycin 0.3% Ophthalmic Solution 1 Drop(s) Right EYE four times a day    MEDICATIONS  (PRN):  acetaminophen   Tablet .. 650 milliGRAM(s) Oral every 6 hours PRN Mild Pain (1 - 3)  ondansetron Injectable 4 milliGRAM(s) IV Push every 6 hours PRN Nausea  ondansetron Injectable 4 milliGRAM(s) IV Push every 6 hours PRN Nausea and/or Vomiting  oxyCODONE    5 mG/acetaminophen 325 mG 1 Tablet(s) Oral every 6 hours PRN Moderate Pain (4 - 6)  oxyCODONE    5 mG/acetaminophen 325 mG 2 Tablet(s) Oral every 6 hours PRN Severe Pain (7 - 10)          Physical exam:                             General:               Pt is awake, alert,  not in any distress                                                  Neuro:                  Nonfocal                             Cardiovascular:   S1 & S2, regular                           Respiratory:         Air entry is fair and equal on both sides, has bilateral conducted sounds                           GI:                          Soft, nondistended and nontender, Bowel sounds active                            Ext:                        No cyanosis or edema     Labs:                                                                           9.7    8.32  )-----------( 196      ( 18 Oct 2018 04:40 )             29.8             10-18    140  |  104  |  13  ----------------------------<  111<H>  3.9   |  24  |  0.78    Ca    8.3<L>      18 Oct 2018 04:40    TPro  7.6  /  Alb  3.3  /  TBili  0.6  /  DBili  x   /  AST  24  /  ALT  18  /  AlkPhos  38<L>  10-18                    LIVER FUNCTIONS - ( 18 Oct 2018 04:40 )  Alb: 3.3 g/dL / Pro: 7.6 g/dL / ALK PHOS: 38 u/L / ALT: 18 u/L / AST: 24 u/L / GGT: x             CXR:    < from: Xray Chest 1 View- PORTABLE-Routine (10.18.18 @ 06:26) >  2 left-sided chest tubes is seen. Surgical clips overlie the left upper   lobe where a rounded opacity is present silhouetting the aortic arch   replacing the loculated pneumothorax or large bullae seen on prior CT   chest. Loss of volume in this lung. Right lung shows no focal   abnormalities.    October 18 at 5:49 AM:  2 left-sided chest tubes unchanged. Loss of volume in the left lung with   postsurgical changes in the left apex. No effusions or pneumothorax.          Plan:    General: 67yo male with medical h/o Pleural effusion, right lung and Lung bullae, reports he was recently admitted at [facility] in 8/2018 for chest pain and SOB. Pt reports he was diagnosed with lung disease in 2006 after many years of tobacco use/smoking. Pt c/o radiating SOB and gets regular pulmonology evaluation. Pt presents today for presurgical testing for Flexible Bronchoscopy, Left Video Assisted Thoracoscopy, Robotic Assisted Left Lobe Bullectomy scheduled for 10/17/2018. (11 Oct 2018 14:50)                          Neuro:                                        Pain control with Tylenol / Percocet                            Cardiovascular:                                          Continue hemodynamic monitoring.                            Respiratory:                                         Pt is on RA                                         Comfortable, not in any distress.                                         Encourage incentive spirometry                                          Monitor chest tube output                                         Chest tube to SUCTION, no air leak                                                                  COPD: Continue bronchodilators, pulmonary toilet                            GI                                         On regular diet                                          Continue Zofran / Reglan for nausea - PRN	                                                                 Renal:                                         Monitor I/Os and electrolytes                                                                                        Hem/ Onc:                                                                                  Monitor chest tube output &  signs of bleeding.                                          Follow CBC in AM                           Infectious disease:                                            No signs of infection. Monitor for fever / leukocytosis.                                          All surgical incision / chest tube  sites look clean                            Endocrine                                           Continue Accu-Checks with coverage    Pt is on SQ Heparin and Venodyne boots for DVT prophylaxis.     Pertinent clinical, laboratory, radiographic, hemodynamic, echocardiographic, respiratory data, microbiologic data and chart were reviewed and analyzed frequently throughout the course of the day and night  Patient seen, examined and plan discussed with CT Surgeon Dr. Aguilar / CTICU team during rounds.    Pt's status discussed with family at bedside, updated status.             Omid Joshi MD

## 2018-10-18 NOTE — PROGRESS NOTE ADULT - SUBJECTIVE AND OBJECTIVE BOX
Anesthesia Pain Management Service    SUBJECTIVE: Patient is doing well with IV PCA and no significant problems reported.    Pain Scale Score	At rest: __3_ 	With Activity: ___ 	[X ] Refer to charted pain scores    THERAPY:    [ ] IV PCA Morphine		[ ] 5 mg/mL	[ ] 1 mg/mL  [X ] IV PCA Hydromorphone	[ ] 5 mg/mL	[X ] 1 mg/mL  [ ] IV PCA Fentanyl		[ ] 50 micrograms/mL    Demand dose __0.2_ lockout __6_ (minutes) Continuous Rate _0__ Total: __1.7_  mg used (in past 24 hours)      MEDICATIONS  (STANDING):  ALBUTerol/ipratropium for Nebulization 3 milliLiter(s) Nebulizer every 6 hours  glycopyrrolate 9 MICROgram(s)/formoterol 4.8 MICROgram(s)Inhaler 2 Puff(s) Inhalation two times a day  heparin  Injectable 5000 Unit(s) SubCutaneous every 8 hours  HYDROmorphone PCA (1 mG/mL) 30 milliLiter(s) PCA Continuous PCA Continuous  influenza   Vaccine 0.5 milliLiter(s) IntraMuscular once  lactated ringers. 1000 milliLiter(s) (50 mL/Hr) IV Continuous <Continuous>  sodium chloride 0.9% lock flush 3 milliLiter(s) IV Push every 8 hours  tobramycin 0.3% Ophthalmic Solution 1 Drop(s) Right EYE four times a day    MEDICATIONS  (PRN):  HYDROmorphone PCA (1 mG/mL) Rescue Clinician Bolus 0.5 milliGRAM(s) IV Push every 15 minutes PRN for Pain Scale GREATER THAN 6  naloxone Injectable 0.1 milliGRAM(s) IV Push every 3 minutes PRN For ANY of the following changes in patient status:  A. RR LESS THAN 10 breaths per minute, B. Oxygen saturation LESS THAN 90%, C. Sedation score of 6  ondansetron Injectable 4 milliGRAM(s) IV Push every 6 hours PRN Nausea      OBJECTIVE: sitting in chair     Sedation Score:	[ X] Alert	[ ] Drowsy 	[ ] Arousable	[ ] Asleep	[ ] Unresponsive    Side Effects:	[X ] None	[ ] Nausea	[ ] Vomiting	[ ] Pruritus  		[ ] Other:    Vital Signs Last 24 Hrs  T(C): 37 (18 Oct 2018 08:00), Max: 37.1 (18 Oct 2018 04:00)  T(F): 98.6 (18 Oct 2018 08:00), Max: 98.8 (18 Oct 2018 04:00)  HR: 66 (18 Oct 2018 08:00) (60 - 78)  BP: 107/56 (18 Oct 2018 08:00) (91/47 - 110/60)  BP(mean): 66 (18 Oct 2018 08:00) (56 - 73)  RR: 20 (18 Oct 2018 08:00) (10 - 29)  SpO2: 92% (18 Oct 2018 08:00) (91% - 100%)    ASSESSMENT/ PLAN    Therapy to  be:	[ X] Continue   [ ] Discontinued   [ ] Change to prn Analgesics    Documentation and Verification of current medications:   [X] Done	[ ] Not done, not elligible    Comments: NPO, continue current therapy

## 2018-10-18 NOTE — PROGRESS NOTE ADULT - SUBJECTIVE AND OBJECTIVE BOX
Anesthesia Pain Management Service    SUBJECTIVE: Pt doing well with IV PCA without problems reported.    Therapy:	  [ X] IV PCA	   [ ] Epidural           [ ] s/p Spinal Opoid              [ ] Postpartum infusion	  [ ] Patient controlled regional anesthesia (PCRA)    [ ] prn Analgesics    Allergies    No Known Allergies    Intolerances      MEDICATIONS  (STANDING):  ALBUTerol/ipratropium for Nebulization 3 milliLiter(s) Nebulizer every 6 hours  glycopyrrolate 9 MICROgram(s)/formoterol 4.8 MICROgram(s)Inhaler 2 Puff(s) Inhalation two times a day  heparin  Injectable 5000 Unit(s) SubCutaneous every 8 hours  HYDROmorphone PCA (1 mG/mL) 30 milliLiter(s) PCA Continuous PCA Continuous  influenza   Vaccine 0.5 milliLiter(s) IntraMuscular once  lactated ringers. 1000 milliLiter(s) (50 mL/Hr) IV Continuous <Continuous>  sodium chloride 0.9% lock flush 3 milliLiter(s) IV Push every 8 hours  tobramycin 0.3% Ophthalmic Solution 1 Drop(s) Right EYE four times a day    MEDICATIONS  (PRN):  HYDROmorphone PCA (1 mG/mL) Rescue Clinician Bolus 0.5 milliGRAM(s) IV Push every 15 minutes PRN for Pain Scale GREATER THAN 6  naloxone Injectable 0.1 milliGRAM(s) IV Push every 3 minutes PRN For ANY of the following changes in patient status:  A. RR LESS THAN 10 breaths per minute, B. Oxygen saturation LESS THAN 90%, C. Sedation score of 6  ondansetron Injectable 4 milliGRAM(s) IV Push every 6 hours PRN Nausea      OBJECTIVE:   [X] No new signs     [ ] Other:    Side Effects:  [X ] None			[ ] Other:    Assessment of Catheter Site:		[ ] Intact		[ ] Other:    ASSESSMENT/PLAN  [ X] Continue current therapy    [ ] Therapy changed to:    [ ] IV PCA       [ ] Epidural     [ ] prn Analgesics     Comments:

## 2018-10-19 LAB
BUN SERPL-MCNC: 11 MG/DL — SIGNIFICANT CHANGE UP (ref 7–23)
CALCIUM SERPL-MCNC: 8.7 MG/DL — SIGNIFICANT CHANGE UP (ref 8.4–10.5)
CHLORIDE SERPL-SCNC: 100 MMOL/L — SIGNIFICANT CHANGE UP (ref 98–107)
CO2 SERPL-SCNC: 24 MMOL/L — SIGNIFICANT CHANGE UP (ref 22–31)
CREAT SERPL-MCNC: 0.77 MG/DL — SIGNIFICANT CHANGE UP (ref 0.5–1.3)
GLUCOSE SERPL-MCNC: 130 MG/DL — HIGH (ref 70–99)
HCT VFR BLD CALC: 34.2 % — LOW (ref 39–50)
HGB BLD-MCNC: 10.8 G/DL — LOW (ref 13–17)
MCHC RBC-ENTMCNC: 31.5 PG — SIGNIFICANT CHANGE UP (ref 27–34)
MCHC RBC-ENTMCNC: 31.6 % — LOW (ref 32–36)
MCV RBC AUTO: 99.7 FL — SIGNIFICANT CHANGE UP (ref 80–100)
NRBC # FLD: 0 — SIGNIFICANT CHANGE UP
PLATELET # BLD AUTO: 180 K/UL — SIGNIFICANT CHANGE UP (ref 150–400)
PMV BLD: 10.2 FL — SIGNIFICANT CHANGE UP (ref 7–13)
POTASSIUM SERPL-MCNC: 4 MMOL/L — SIGNIFICANT CHANGE UP (ref 3.5–5.3)
POTASSIUM SERPL-SCNC: 4 MMOL/L — SIGNIFICANT CHANGE UP (ref 3.5–5.3)
RBC # BLD: 3.43 M/UL — LOW (ref 4.2–5.8)
RBC # FLD: 15.9 % — HIGH (ref 10.3–14.5)
SODIUM SERPL-SCNC: 136 MMOL/L — SIGNIFICANT CHANGE UP (ref 135–145)
WBC # BLD: 8.05 K/UL — SIGNIFICANT CHANGE UP (ref 3.8–10.5)
WBC # FLD AUTO: 8.05 K/UL — SIGNIFICANT CHANGE UP (ref 3.8–10.5)

## 2018-10-19 PROCEDURE — 71045 X-RAY EXAM CHEST 1 VIEW: CPT | Mod: 26,77

## 2018-10-19 PROCEDURE — 71045 X-RAY EXAM CHEST 1 VIEW: CPT | Mod: 26,76

## 2018-10-19 PROCEDURE — 99233 SBSQ HOSP IP/OBS HIGH 50: CPT

## 2018-10-19 RX ORDER — METOPROLOL TARTRATE 50 MG
125 TABLET ORAL
Qty: 0 | Refills: 0 | Status: DISCONTINUED | OUTPATIENT
Start: 2018-10-19 | End: 2018-10-19

## 2018-10-19 RX ORDER — SODIUM CHLORIDE 9 MG/ML
1000 INJECTION, SOLUTION INTRAVENOUS
Qty: 0 | Refills: 0 | Status: DISCONTINUED | OUTPATIENT
Start: 2018-10-19 | End: 2018-10-19

## 2018-10-19 RX ADMIN — Medication 125 MILLIGRAM(S): at 13:21

## 2018-10-19 RX ADMIN — HEPARIN SODIUM 5000 UNIT(S): 5000 INJECTION INTRAVENOUS; SUBCUTANEOUS at 13:22

## 2018-10-19 RX ADMIN — GLYCOPYRROLATE AND FORMOTEROL FUMARATE 2 PUFF(S): 9; 4.8 AEROSOL, METERED RESPIRATORY (INHALATION) at 09:54

## 2018-10-19 RX ADMIN — Medication 3 MILLILITER(S): at 16:21

## 2018-10-19 RX ADMIN — HEPARIN SODIUM 5000 UNIT(S): 5000 INJECTION INTRAVENOUS; SUBCUTANEOUS at 05:03

## 2018-10-19 RX ADMIN — SODIUM CHLORIDE 3 MILLILITER(S): 9 INJECTION INTRAMUSCULAR; INTRAVENOUS; SUBCUTANEOUS at 04:36

## 2018-10-19 RX ADMIN — Medication 3 MILLILITER(S): at 04:20

## 2018-10-19 RX ADMIN — HEPARIN SODIUM 5000 UNIT(S): 5000 INJECTION INTRAVENOUS; SUBCUTANEOUS at 21:31

## 2018-10-19 RX ADMIN — GLYCOPYRROLATE AND FORMOTEROL FUMARATE 2 PUFF(S): 9; 4.8 AEROSOL, METERED RESPIRATORY (INHALATION) at 21:31

## 2018-10-19 RX ADMIN — SODIUM CHLORIDE 50 MILLILITER(S): 9 INJECTION, SOLUTION INTRAVENOUS at 05:03

## 2018-10-19 RX ADMIN — SODIUM CHLORIDE 3 MILLILITER(S): 9 INJECTION INTRAMUSCULAR; INTRAVENOUS; SUBCUTANEOUS at 13:22

## 2018-10-19 RX ADMIN — Medication 3 MILLILITER(S): at 09:52

## 2018-10-19 NOTE — PROGRESS NOTE ADULT - SUBJECTIVE AND OBJECTIVE BOX
SINA HANLEY            MRN-[id number]         No Known Allergies             69yo male with medical h/o Pleural effusion, right lung and Lung bullae, reports he was recently admitted at [facility] in 8/2018 for chest pain and SOB. Pt reports he was diagnosed with lung disease in 2006 after many years of tobacco use/smoking. Pt c/o radiating SOB and gets regular pulmonology evaluation. Pt presents today for presurgical testing for Flexible Bronchoscopy, Left Video Assisted Thoracoscopy, Robotic Assisted Left Lobe Bullectomy scheduled for 10/17/2018. (11 Oct 2018 14:50)      Procedure: Flexible bronchoscopy, robotic assisted left sided VATS, pneumolysis, lung volume reduction of left upper lobe, mechanical pleurodesis  10/17/2018       Issues:  Lung bullae  COPD  Postop pain                   Home Medications:  Bevespi Aerosphere 9 mcg-4.8 mcg/inh inhalation aerosol: 2 puff(s) inhaled 2 times a day (17 Oct 2018 07:03)      PAST MEDICAL & SURGICAL HISTORY:  Pleural effusion  Smoker  H/O chest tube placement        ICU Vital Signs Last 24 Hrs  T(C): 36.7 (19 Oct 2018 04:00), Max: 37.2 (19 Oct 2018 00:00)  T(F): 98.1 (19 Oct 2018 04:00), Max: 99 (19 Oct 2018 00:00)  HR: 112 (19 Oct 2018 05:00) (60 - 112)  BP: 103/64 (19 Oct 2018 05:00) (84/47 - 112/62)  BP(mean): 74 (19 Oct 2018 05:00) (56 - 74)  ABP: --  ABP(mean): --  RR: 24 (19 Oct 2018 05:00) (15 - 27)  SpO2: 96% (19 Oct 2018 05:00) (92% - 100%)    I&O's Detail    17 Oct 2018 07:01  -  18 Oct 2018 07:00  --------------------------------------------------------  IN:    IV PiggyBack: 500 mL    lactated ringers.: 830 mL  Total IN: 1330 mL    OUT:    Chest Tube: 38 mL    Chest Tube: 345 mL    Indwelling Catheter - Urethral: 1370 mL  Total OUT: 1753 mL    Total NET: -423 mL      18 Oct 2018 07:01  -  19 Oct 2018 05:59  --------------------------------------------------------  IN:    IV PiggyBack: 200 mL    lactated ringers.: 450 mL    lactated ringers.: 350 mL    Oral Fluid: 240 mL  Total IN: 1240 mL    OUT:    Chest Tube: 70 mL    Chest Tube: 80 mL    Voided: 200 mL  Total OUT: 350 mL    Total NET: 890 mL        CAPILLARY BLOOD GLUCOSE          Home Medications:  Bevespi Aerosphere 9 mcg-4.8 mcg/inh inhalation aerosol: 2 puff(s) inhaled 2 times a day (17 Oct 2018 07:03)      MEDICATIONS  (STANDING):  ALBUTerol/ipratropium for Nebulization 3 milliLiter(s) Nebulizer every 6 hours  glycopyrrolate 9 MICROgram(s)/formoterol 4.8 MICROgram(s)Inhaler 2 Puff(s) Inhalation two times a day  heparin  Injectable 5000 Unit(s) SubCutaneous every 8 hours  influenza   Vaccine 0.5 milliLiter(s) IntraMuscular once  lactated ringers. 1000 milliLiter(s) (50 mL/Hr) IV Continuous <Continuous>  sodium chloride 0.9% lock flush 3 milliLiter(s) IV Push every 8 hours  tobramycin 0.3% Ophthalmic Solution 1 Drop(s) Right EYE four times a day    MEDICATIONS  (PRN):  acetaminophen   Tablet .. 650 milliGRAM(s) Oral every 6 hours PRN Mild Pain (1 - 3)  ondansetron Injectable 4 milliGRAM(s) IV Push every 6 hours PRN Nausea  ondansetron Injectable 4 milliGRAM(s) IV Push every 6 hours PRN Nausea and/or Vomiting  oxyCODONE    5 mG/acetaminophen 325 mG 1 Tablet(s) Oral every 6 hours PRN Moderate Pain (4 - 6)  oxyCODONE    5 mG/acetaminophen 325 mG 2 Tablet(s) Oral every 6 hours PRN Severe Pain (7 - 10)          Physical exam:     General:               Pt is awake, alert,  not in any distress                                                  Neuro:                  Nonfocal                             Cardiovascular:   S1 & S2, regular                           Respiratory:         Air entry is fair and equal on both sides, has bilateral conducted sounds                           GI:                          Soft, nondistended and nontender, Bowel sounds active                            Ext:                        No cyanosis or edema                            Labs:                                                                           10.8   8.05  )-----------( 180      ( 19 Oct 2018 04:55 )             34.2             10-19    136  |  100  |  11  ----------------------------<  130<H>  4.0   |  24  |  0.77    Ca    8.7      19 Oct 2018 04:55    TPro  7.6  /  Alb  3.3  /  TBili  0.6  /  DBili  x   /  AST  24  /  ALT  18  /  AlkPhos  38<L>  10-18                    LIVER FUNCTIONS - ( 18 Oct 2018 04:40 )  Alb: 3.3 g/dL / Pro: 7.6 g/dL / ALK PHOS: 38 u/L / ALT: 18 u/L / AST: 24 u/L / GGT: x                 CXR:    < from: Xray Chest 1 View- PORTABLE-Routine (10.18.18 @ 06:26) >  2 left-sided chest tubes is seen. Surgical clips overlie the left upper   lobe where a rounded opacity is present silhouetting the aortic arch   replacing the loculated pneumothorax or large bullae seen on prior CT   chest. Loss of volume in this lung. Right lung shows no focal   abnormalities.    October 18 at 5:49 AM:  2 left-sided chest tubes unchanged. Loss of volume in the left lung with   postsurgical changes in the left apex. No effusions or pneumothorax.      COMPARISON:  CT chest October 10, 2018      IMPRESSION:  Status post left thoracotomy with chest tubes.            Plan:    General: 69yo male with medical h/o Pleural effusion, right lung and Lung bullae, reports he was recently admitted at [facility] in 8/2018 for chest pain and SOB. Pt reports he was diagnosed with lung disease in 2006 after many years of tobacco use/smoking. Pt c/o radiating SOB and gets regular pulmonology evaluation. Pt presents today for presurgical testing for Flexible Bronchoscopy, Left Video Assisted Thoracoscopy, Robotic Assisted Left Lobe Bullectomy scheduled for 10/17/2018. (11 Oct 2018 14:50)                          Neuro:                                        Pain control with Tylenol / Percocet                            Cardiovascular:                                          Continue hemodynamic monitoring.                            Respiratory:                                         Pt is on RA                                         Comfortable, not in any distress.                                         Encourage incentive spirometry                                          Monitor chest tube output                                         Chest tube to SUCTION, no air leak                                                                  COPD: Continue bronchodilators, pulmonary toilet                            GI                                         On regular diet                                          Continue Zofran / Reglan for nausea - PRN	                                                                 Renal:                                         Monitor I/Os and electrolytes                                                                                        Hem/ Onc:                                                                                  Monitor chest tube output &  signs of bleeding.                                          Follow CBC in AM                           Infectious disease:                                            No signs of infection. Monitor for fever / leukocytosis.                                          All surgical incision / chest tube  sites look clean                            Endocrine                                           Continue Accu-Checks with coverage    Pt is on SQ Heparin and Venodyne boots for DVT prophylaxis.     Pertinent clinical, laboratory, radiographic, hemodynamic, echocardiographic, respiratory data, microbiologic data and chart were reviewed and analyzed frequently throughout the course of the day and night  Patient seen, examined and plan discussed with CT Surgeon Dr. Aguilar / CTICU team during rounds.      8T when bed is available            Omid Joshi MD

## 2018-10-20 ENCOUNTER — TRANSCRIPTION ENCOUNTER (OUTPATIENT)
Age: 68
End: 2018-10-20

## 2018-10-20 VITALS — HEART RATE: 94 BPM

## 2018-10-20 LAB
ALBUMIN SERPL ELPH-MCNC: 3.2 G/DL — LOW (ref 3.3–5)
ALP SERPL-CCNC: 40 U/L — SIGNIFICANT CHANGE UP (ref 40–120)
ALT FLD-CCNC: 18 U/L — SIGNIFICANT CHANGE UP (ref 4–41)
AST SERPL-CCNC: 18 U/L — SIGNIFICANT CHANGE UP (ref 4–40)
BILIRUB SERPL-MCNC: 0.9 MG/DL — SIGNIFICANT CHANGE UP (ref 0.2–1.2)
BUN SERPL-MCNC: 10 MG/DL — SIGNIFICANT CHANGE UP (ref 7–23)
CALCIUM SERPL-MCNC: 8.6 MG/DL — SIGNIFICANT CHANGE UP (ref 8.4–10.5)
CHLORIDE SERPL-SCNC: 101 MMOL/L — SIGNIFICANT CHANGE UP (ref 98–107)
CO2 SERPL-SCNC: 25 MMOL/L — SIGNIFICANT CHANGE UP (ref 22–31)
CREAT SERPL-MCNC: 0.77 MG/DL — SIGNIFICANT CHANGE UP (ref 0.5–1.3)
GLUCOSE SERPL-MCNC: 94 MG/DL — SIGNIFICANT CHANGE UP (ref 70–99)
HCT VFR BLD CALC: 31.3 % — LOW (ref 39–50)
HGB BLD-MCNC: 10 G/DL — LOW (ref 13–17)
MCHC RBC-ENTMCNC: 30.7 PG — SIGNIFICANT CHANGE UP (ref 27–34)
MCHC RBC-ENTMCNC: 31.9 % — LOW (ref 32–36)
MCV RBC AUTO: 96 FL — SIGNIFICANT CHANGE UP (ref 80–100)
NRBC # FLD: 0 — SIGNIFICANT CHANGE UP
PLATELET # BLD AUTO: 178 K/UL — SIGNIFICANT CHANGE UP (ref 150–400)
PMV BLD: 10.4 FL — SIGNIFICANT CHANGE UP (ref 7–13)
POTASSIUM SERPL-MCNC: 4 MMOL/L — SIGNIFICANT CHANGE UP (ref 3.5–5.3)
POTASSIUM SERPL-SCNC: 4 MMOL/L — SIGNIFICANT CHANGE UP (ref 3.5–5.3)
PROT SERPL-MCNC: 7.8 G/DL — SIGNIFICANT CHANGE UP (ref 6–8.3)
RBC # BLD: 3.26 M/UL — LOW (ref 4.2–5.8)
RBC # FLD: 15.6 % — HIGH (ref 10.3–14.5)
SODIUM SERPL-SCNC: 138 MMOL/L — SIGNIFICANT CHANGE UP (ref 135–145)
WBC # BLD: 6.31 K/UL — SIGNIFICANT CHANGE UP (ref 3.8–10.5)
WBC # FLD AUTO: 6.31 K/UL — SIGNIFICANT CHANGE UP (ref 3.8–10.5)

## 2018-10-20 PROCEDURE — 71045 X-RAY EXAM CHEST 1 VIEW: CPT | Mod: 26

## 2018-10-20 RX ORDER — FAMOTIDINE 10 MG/ML
20 INJECTION INTRAVENOUS DAILY
Qty: 0 | Refills: 0 | Status: DISCONTINUED | OUTPATIENT
Start: 2018-10-20 | End: 2018-10-20

## 2018-10-20 RX ADMIN — SODIUM CHLORIDE 3 MILLILITER(S): 9 INJECTION INTRAMUSCULAR; INTRAVENOUS; SUBCUTANEOUS at 03:06

## 2018-10-20 RX ADMIN — FAMOTIDINE 20 MILLIGRAM(S): 10 INJECTION INTRAVENOUS at 11:35

## 2018-10-20 RX ADMIN — GLYCOPYRROLATE AND FORMOTEROL FUMARATE 2 PUFF(S): 9; 4.8 AEROSOL, METERED RESPIRATORY (INHALATION) at 09:26

## 2018-10-20 RX ADMIN — SODIUM CHLORIDE 3 MILLILITER(S): 9 INJECTION INTRAMUSCULAR; INTRAVENOUS; SUBCUTANEOUS at 05:18

## 2018-10-20 RX ADMIN — SODIUM CHLORIDE 3 MILLILITER(S): 9 INJECTION INTRAMUSCULAR; INTRAVENOUS; SUBCUTANEOUS at 11:26

## 2018-10-20 RX ADMIN — Medication 3 MILLILITER(S): at 10:42

## 2018-10-20 RX ADMIN — HEPARIN SODIUM 5000 UNIT(S): 5000 INJECTION INTRAVENOUS; SUBCUTANEOUS at 05:18

## 2018-10-20 RX ADMIN — Medication 650 MILLIGRAM(S): at 15:53

## 2018-10-20 NOTE — DISCHARGE NOTE ADULT - PLAN OF CARE
Wound healing, return to activities of daily life Follow up with Dr Aguilar as directed.  Ambulate frequently throughout the day  Inventive spirometer  Follow up with your primary doctor in 1 week

## 2018-10-20 NOTE — DISCHARGE NOTE ADULT - MEDICATION SUMMARY - MEDICATIONS TO TAKE
I will START or STAY ON the medications listed below when I get home from the hospital:    Bevespi Aerosphere 9 mcg-4.8 mcg/inh inhalation aerosol  -- 2 puff(s) inhaled 2 times a day  -- Indication: For Bronchospasm I will START or STAY ON the medications listed below when I get home from the hospital:    oxyCODONE-acetaminophen 5 mg-325 mg oral tablet  -- 1 tab(s) by mouth every 6 hours, As Needed -for moderate pain MDD:4   -- Caution federal law prohibits the transfer of this drug to any person other  than the person for whom it was prescribed.  May cause drowsiness.  Alcohol may intensify this effect.  Use care when operating dangerous machinery.  This prescription cannot be refilled.  This product contains acetaminophen.  Do not use  with any other product containing acetaminophen to prevent possible liver damage.  Using more of this medication than prescribed may cause serious breathing problems.    -- Indication: For Postoperative pain    Bevespi Aerosphere 9 mcg-4.8 mcg/inh inhalation aerosol  -- 2 puff(s) inhaled 2 times a day  -- Indication: For Bronchospasm

## 2018-10-20 NOTE — DISCHARGE NOTE ADULT - PATIENT PORTAL LINK FT
You can access the [facility] Patient Portal, offered by [facility], by registering with the following website: [email or web address]

## 2018-10-20 NOTE — DISCHARGE NOTE ADULT - NS AS ACTIVITY OBS
Walking-Indoors allowed/Walking-Outdoors allowed/Do not drive or operate machinery/No Heavy lifting/straining/Do not make important decisions

## 2018-10-20 NOTE — PROGRESS NOTE ADULT - SUBJECTIVE AND OBJECTIVE BOX
68 year old male POD 3 s/p flexible bronchoscopy, robotic assisted left VATS, left upper lobe lung volume reduction surgery, mechanical pleurodesis.  Patient seen and examined.  Pain well controlled.  Anterior chest tube removed yesterday, tolerated well.  Posterior chest tube with minimal drainage over the last 24 hours.  No air leak.      ICU Vital Signs Last 24 Hrs  T(C): 36.9 (20 Oct 2018 09:00), Max: 37.1 (19 Oct 2018 16:00)  T(F): 98.4 (20 Oct 2018 09:00), Max: 98.7 (19 Oct 2018 16:00)  HR: 103 (20 Oct 2018 09:00) (65 - 111)  BP: 108/62 (20 Oct 2018 09:00) (95/58 - 108/62)  BP(mean): 54 (19 Oct 2018 16:00) (54 - 73)  ABP: --  ABP(mean): --  RR: 18 (20 Oct 2018 09:00) (16 - 24)  SpO2: 98% (20 Oct 2018 09:00) (93% - 98%)    I&O's Detail    19 Oct 2018 07:01  -  20 Oct 2018 07:00  --------------------------------------------------------  IN:  Total IN: 0 mL    OUT:    Chest Tube: 10 mL    Voided: 1700 mL  Total OUT: 1710 mL    Total NET: -1710 mL      MEDICATIONS  (STANDING):  ALBUTerol/ipratropium for Nebulization 3 milliLiter(s) Nebulizer every 6 hours  famotidine    Tablet 20 milliGRAM(s) Oral daily  glycopyrrolate 9 MICROgram(s)/formoterol 4.8 MICROgram(s)Inhaler 2 Puff(s) Inhalation two times a day  heparin  Injectable 5000 Unit(s) SubCutaneous every 8 hours  influenza   Vaccine 0.5 milliLiter(s) IntraMuscular once  sodium chloride 0.9% lock flush 3 milliLiter(s) IV Push every 8 hours  tobramycin 0.3% Ophthalmic Solution 1 Drop(s) Right EYE four times a day    MEDICATIONS  (PRN):  acetaminophen   Tablet .. 650 milliGRAM(s) Oral every 6 hours PRN Mild Pain (1 - 3)  ondansetron Injectable 4 milliGRAM(s) IV Push every 6 hours PRN Nausea  ondansetron Injectable 4 milliGRAM(s) IV Push every 6 hours PRN Nausea and/or Vomiting  oxyCODONE    5 mG/acetaminophen 325 mG 1 Tablet(s) Oral every 6 hours PRN Moderate Pain (4 - 6)  oxyCODONE    5 mG/acetaminophen 325 mG 2 Tablet(s) Oral every 6 hours PRN Severe Pain (7 - 10)    General:  Sitting up in bed, appears comfortable  Chest:  Breath sounds audible bilaterally; no wheezing, rales or ronchi  Abdomen:  Soft, nontender, nondistended  Extremities:  Warm, well perfused                           10.0   6.31  )-----------( 178      ( 20 Oct 2018 07:10 )             31.3   10-20    138  |  101  |  10  ----------------------------<  94  4.0   |  25  |  0.77    Ca    8.6      20 Oct 2018 07:10    TPro  7.8  /  Alb  3.2<L>  /  TBili  0.9  /  DBili  x   /  AST  18  /  ALT  18  /  AlkPhos  40  10-20    < from: Xray Chest 1 View- PORTABLE-Urgent (10.19.18 @ 11:43) >  ROCEDURE DATE:  Oct 19 2018         INTERPRETATION:  TIME OF EXAM: October 19, 2018 at 5:49 AM; 11:12 AM    CLINICAL INFORMATION: Postop pre and post chest tube removal.    TECHNIQUE:   Portable chest    INTERPRETATION:     5:49 AM:  2 left-sided chest tubes again seen unchanged from the previous study.   Loss of volume in the left lung with upper lobe opacity unchanged. Small   layering right effusion is present as well.    11:12 AM:  No change in the position of the 2 left-sided chest tubes. Small right   effusion is stable. Postop opacity in the left apex. The heart is not   enlarged.      COMPARISON:  October 18      IMPRESSION:  Status post left thoracotomy with chest tubes.      < end of copied text >

## 2018-10-20 NOTE — DISCHARGE NOTE ADULT - INSTRUCTIONS
As tolerated Any pain not relieved with medications, worsening shortness of breath or bleeding from surgical site please call your surgeon. Continue to ambulate and use incentive spirometer as tolerated.

## 2018-10-20 NOTE — DISCHARGE NOTE ADULT - ADDITIONAL INSTRUCTIONS
Call to make an appointment with Dr Aguilar for next week.  Call to make an appointment with your regular doctor for next week.

## 2018-10-20 NOTE — DISCHARGE NOTE ADULT - CARE PLAN
Principal Discharge DX:	Emphysema lung  Goal:	Wound healing, return to activities of daily life  Assessment and plan of treatment:	Follow up with Dr Aguilar as directed.  Ambulate frequently throughout the day  Inventive spirometer  Follow up with your primary doctor in 1 week

## 2018-10-20 NOTE — DISCHARGE NOTE ADULT - CONDITIONS AT DISCHARGE
Pt is alert and orientedx4. Denies pain at this time. Left CT site is c/d/i. Tolerated ambulation. Tele monitor d/c. IV d/c.

## 2018-10-20 NOTE — DISCHARGE NOTE ADULT - CARE PROVIDER_API CALL
Lea Aguilar), Surgery; Thoracic Surgery  [id number] [address] [place] [address]  Phone: [phone]  Fax: [phone]

## 2018-10-22 PROBLEM — F17.200 NICOTINE DEPENDENCE, UNSPECIFIED, UNCOMPLICATED: Chronic | Status: ACTIVE | Noted: 2018-10-11

## 2018-10-22 PROBLEM — J90 PLEURAL EFFUSION, NOT ELSEWHERE CLASSIFIED: Chronic | Status: ACTIVE | Noted: 2018-10-11

## 2018-11-01 ENCOUNTER — APPOINTMENT (OUTPATIENT)
Dept: THORACIC SURGERY | Facility: CLINIC | Age: 68
End: 2018-11-01
Payer: MEDICARE

## 2018-11-01 VITALS
RESPIRATION RATE: 16 BRPM | DIASTOLIC BLOOD PRESSURE: 70 MMHG | TEMPERATURE: 98.3 F | WEIGHT: 174 LBS | SYSTOLIC BLOOD PRESSURE: 104 MMHG | HEIGHT: 73 IN | HEART RATE: 80 BPM | OXYGEN SATURATION: 97 % | BODY MASS INDEX: 23.06 KG/M2

## 2018-11-01 DIAGNOSIS — Z98.890 OTHER SPECIFIED POSTPROCEDURAL STATES: ICD-10-CM

## 2018-11-01 PROCEDURE — 99024 POSTOP FOLLOW-UP VISIT: CPT

## 2018-11-02 ENCOUNTER — OUTPATIENT (OUTPATIENT)
Dept: OUTPATIENT SERVICES | Facility: HOSPITAL | Age: 68
LOS: 1 days | End: 2018-11-02
Payer: MEDICARE

## 2018-11-02 ENCOUNTER — APPOINTMENT (OUTPATIENT)
Dept: RADIOLOGY | Facility: IMAGING CENTER | Age: 68
End: 2018-11-02
Payer: MEDICARE

## 2018-11-02 DIAGNOSIS — Z98.890 OTHER SPECIFIED POSTPROCEDURAL STATES: Chronic | ICD-10-CM

## 2018-11-02 DIAGNOSIS — J43.9 EMPHYSEMA, UNSPECIFIED: ICD-10-CM

## 2018-11-02 DIAGNOSIS — Z98.890 OTHER SPECIFIED POSTPROCEDURAL STATES: ICD-10-CM

## 2018-11-02 PROCEDURE — 71046 X-RAY EXAM CHEST 2 VIEWS: CPT

## 2018-11-02 PROCEDURE — 71046 X-RAY EXAM CHEST 2 VIEWS: CPT | Mod: 26

## 2018-11-13 ENCOUNTER — APPOINTMENT (OUTPATIENT)
Dept: PULMONOLOGY | Facility: CLINIC | Age: 68
End: 2018-11-13
Payer: MEDICARE

## 2018-11-13 VITALS
SYSTOLIC BLOOD PRESSURE: 115 MMHG | HEART RATE: 73 BPM | WEIGHT: 178 LBS | HEIGHT: 73 IN | BODY MASS INDEX: 23.59 KG/M2 | DIASTOLIC BLOOD PRESSURE: 70 MMHG | OXYGEN SATURATION: 96 % | RESPIRATION RATE: 17 BRPM

## 2018-11-13 PROCEDURE — 99214 OFFICE O/P EST MOD 30 MIN: CPT

## 2018-11-18 NOTE — PHYSICAL THERAPY INITIAL EVALUATION ADULT - ASSISTIVE DEVICE FOR TRANSFER: GAIT, REHAB EVAL
Discussion/Summary   STILL HAS  PRE DIABETES   SEE UROLOGY WITH PSA        Verified Results  COMP METABOLIC PANEL WITH LIPID PANEL (CPNL,LIPDPL) 25Jun2018 12:41PM [facility]     Test Name Result Flag Reference   SODIUM 141 mmol/L  135-145   POTASSIUM 4.3 mmol/L  3.4-5.1   CHLORIDE 104 mmol/L     CARBON DIOXIDE 29 mmol/L  21-32   ANION GAP 12 mmol/L  10-20   GLUCOSE 96 mg/dl  65-99   BUN 13 mg/dl  6-20   CREATININE 1.19 mg/dl H 0.67-1.17   GFR EST.AFRICAN AMER 70     eGFR 60 - 89 mL/min/1.73m2 = Mild decrease in kidney function.   GFR EST.NONAFRI AMER 60     eGFR 60 - 89 mL/min/1.73m2 = Mild decrease in kidney function.   BUN/CREATININE RATIO 11  7-25   BILIRUBIN TOTAL 0.5 mg/dl  0.2-1.0   GOT/AST 25 Units/L  <38   ALKALINE PHOSPHATASE 54 Units/L     ALBUMIN 4.2 g/dl  3.6-5.1   TOTAL PROTEIN 7.9 g/dl  6.4-8.2   GLOBULIN (CALCULATED) 3.7 g/dl  2.0-4.0   A/G RATIO 1.1  1.0-2.4   CALCIUM 9.3 mg/dl  8.4-10.2   GPT/ALT 22 Units/L  <79   FASTING STATUS 12 hrs     CHOLESTEROL 242 mg/dl H <200   Desirable            <200  Borderline High      200 to 239  High                 >=240   HDL CHOLESTEROL 67 mg/dl  >39   Low            <40  Borderline Low 40 to 49  Near Optimal   50 to 59  Optimal        >=60   TRIGLYCERIDES 94 mg/dl  <150   Normal                   <150  Borderline High          150 to 199  High                     200 to 499  Very High                >=500   LDL CHOLESTEROL (CALCULATED) 156 mg/dl H <130   OPTIMAL               <100  NEAR OPTIMAL          100-129  BORDERLINE HIGH       130-159  HIGH                  160-189  VERY HIGH             >=190   NON-HDL CHOLESTEROL 175 mg/dl     Therapeutic Target:  CHD and risk equivalents <130  Multiple risk factors    <160  0 to 1 risk factors      <190   CHOLESTEROL/HDL RATIO 3.6  <4.5   FASTING STATUS 12 hrs       HEMOGLOBIN A1C GLYCOSYLATED 25Jun2018 12:41PM [facility]     Test Name Result Flag Reference   HEMOGLOBIN A1C GLYH 6.2 % H 4.5-5.6    ----DIABETIC SCREENING---  NON DIABETIC                 <5.7%  INCREASED RISK                5.7-6.4%  DIAGNOSTIC FOR DIABETES      >6.4%     ----DIABETIC CONTROL---     A1C%           eAG mg/dL  6.0            126  6.5            140  7.0            154  7.5            169  8.0            183  8.5            197  9.0            212  9.5            226  10.0           240     PSA - PROSTATE SPECIFIC AG 25Jun2018 12:41PM [facility]     Test Name Result Flag Reference   PROSTATE SPECIFIC AG 2.22 ng/ml  <4.01   SUGGESTED AGE SPECIFIC REFERENCE RANGES     AGE                NG/ML  40-49              0.01-2.50  50-59              0.01-3.50  60-69              0.01-4.50  70-79              0.01-6.50     REFERENCE: JOURNAL OF UROLOGY 155, [id number]     [employer] Chemiluminescence        IV pole

## 2018-11-29 ENCOUNTER — APPOINTMENT (OUTPATIENT)
Dept: THORACIC SURGERY | Facility: CLINIC | Age: 68
End: 2018-11-29
Payer: MEDICARE

## 2018-11-29 ENCOUNTER — APPOINTMENT (OUTPATIENT)
Dept: PULMONOLOGY | Facility: CLINIC | Age: 68
End: 2018-11-29

## 2018-11-29 ENCOUNTER — TRANSCRIPTION ENCOUNTER (OUTPATIENT)
Age: 68
End: 2018-11-29

## 2018-11-29 ENCOUNTER — APPOINTMENT (OUTPATIENT)
Dept: PULMONOLOGY | Facility: CLINIC | Age: 68
End: 2018-11-29
Payer: MEDICARE

## 2018-11-29 VITALS
SYSTOLIC BLOOD PRESSURE: 110 MMHG | BODY MASS INDEX: 24.39 KG/M2 | HEIGHT: 73 IN | OXYGEN SATURATION: 96 % | DIASTOLIC BLOOD PRESSURE: 80 MMHG | RESPIRATION RATE: 18 BRPM | HEART RATE: 80 BPM | WEIGHT: 184 LBS

## 2018-11-29 VITALS
OXYGEN SATURATION: 96 % | DIASTOLIC BLOOD PRESSURE: 73 MMHG | RESPIRATION RATE: 18 BRPM | WEIGHT: 184 LBS | BODY MASS INDEX: 24.28 KG/M2 | SYSTOLIC BLOOD PRESSURE: 110 MMHG | HEART RATE: 70 BPM

## 2018-11-29 DIAGNOSIS — J18.9 PNEUMONIA, UNSPECIFIED ORGANISM: ICD-10-CM

## 2018-11-29 DIAGNOSIS — J91.8 PNEUMONIA, UNSPECIFIED ORGANISM: ICD-10-CM

## 2018-11-29 PROCEDURE — 94729 DIFFUSING CAPACITY: CPT

## 2018-11-29 PROCEDURE — 94727 GAS DIL/WSHOT DETER LNG VOL: CPT

## 2018-11-29 PROCEDURE — 94060 EVALUATION OF WHEEZING: CPT

## 2018-11-29 PROCEDURE — 99024 POSTOP FOLLOW-UP VISIT: CPT

## 2018-11-29 PROCEDURE — 99215 OFFICE O/P EST HI 40 MIN: CPT | Mod: 25

## 2019-03-14 ENCOUNTER — TRANSCRIPTION ENCOUNTER (OUTPATIENT)
Age: 69
End: 2019-03-14

## 2019-10-10 ENCOUNTER — APPOINTMENT (OUTPATIENT)
Dept: THORACIC SURGERY | Facility: CLINIC | Age: 69
End: 2019-10-10
Payer: MEDICARE

## 2019-10-10 ENCOUNTER — APPOINTMENT (OUTPATIENT)
Dept: RADIOLOGY | Facility: HOSPITAL | Age: 69
End: 2019-10-10

## 2019-10-10 ENCOUNTER — APPOINTMENT (OUTPATIENT)
Dept: PULMONOLOGY | Facility: CLINIC | Age: 69
End: 2019-10-10
Payer: MEDICARE

## 2019-10-10 ENCOUNTER — OUTPATIENT (OUTPATIENT)
Dept: OUTPATIENT SERVICES | Facility: HOSPITAL | Age: 69
LOS: 1 days | End: 2019-10-10
Payer: MEDICARE

## 2019-10-10 VITALS
TEMPERATURE: 97.7 F | HEIGHT: 73 IN | BODY MASS INDEX: 25.18 KG/M2 | OXYGEN SATURATION: 95 % | SYSTOLIC BLOOD PRESSURE: 128 MMHG | HEART RATE: 70 BPM | DIASTOLIC BLOOD PRESSURE: 82 MMHG | RESPIRATION RATE: 17 BRPM | WEIGHT: 190 LBS

## 2019-10-10 DIAGNOSIS — J44.9 CHRONIC OBSTRUCTIVE PULMONARY DISEASE, UNSPECIFIED: ICD-10-CM

## 2019-10-10 DIAGNOSIS — J43.9 EMPHYSEMA, UNSPECIFIED: ICD-10-CM

## 2019-10-10 DIAGNOSIS — Z98.890 OTHER SPECIFIED POSTPROCEDURAL STATES: Chronic | ICD-10-CM

## 2019-10-10 PROCEDURE — 71046 X-RAY EXAM CHEST 2 VIEWS: CPT | Mod: 26

## 2019-10-10 PROCEDURE — 94060 EVALUATION OF WHEEZING: CPT

## 2019-10-10 PROCEDURE — 99215 OFFICE O/P EST HI 40 MIN: CPT

## 2019-10-10 PROCEDURE — 99215 OFFICE O/P EST HI 40 MIN: CPT | Mod: 25

## 2019-10-10 PROCEDURE — 94729 DIFFUSING CAPACITY: CPT

## 2019-10-10 PROCEDURE — 94727 GAS DIL/WSHOT DETER LNG VOL: CPT

## 2019-10-10 PROCEDURE — ZZZZZ: CPT

## 2019-10-10 RX ORDER — GLYCOPYRROLATE AND FORMOTEROL FUMARATE 9; 4.8 UG/1; UG/1
9-4.8 AEROSOL, METERED RESPIRATORY (INHALATION)
Refills: 0 | Status: DISCONTINUED | COMMUNITY
End: 2019-10-10

## 2019-10-10 NOTE — ASSESSMENT
[FreeTextEntry1] : 69 year old male, follow up. History of pulmonary bullous disease and pleural effusion. Upper lobe consolidation with loculated effusion in August 2018, completed three weeks of IV antibiotics via PICC line then one week of Augmentin PO.  Now s/p robotic assisted TANNA lung volume reduction surgery, pneumolysis, and mechanical pleurodesis on 10/17/18. Pathology revealed acute and chronic inflammation with abscess formation and organizing pneumonia. Wall of cystic lesion consistent with bullae.\par \par CXR today reveals:\par - Probable enlarging bulla in the right lower lung\par - Increased lucencies in the anterior base and new oval lucency projecting over the mid chest on the lateral image likely bulla which are new and/or enlarged\par \par I have reviewed the patient's medical records and diagnostic images during the time of this office visit, and I have made the following recommendation:\par 1.  Due to right lung  bullous disease - for any signs of infection, he should return promptly and will likely require Robotic RUL Lung Volume Reduction Surgery, Pneumolyiss, and Mechanical Pleurodesis\par 2.  He wishes to return on an as needed basis, and he will contact my office if any issues arise.\par \par \par Written by Lisseth Saeed NP, acting as a scribe for Arthur Aguilar MD.\par \par The documentation recorded by the scribe accurately reflects the service I personally performed and the decisions made by me. ARTHUR AGUILAR MD

## 2019-10-10 NOTE — CONSULT LETTER
[Courtesy Letter:] : I had the pleasure of seeing your patient, [unfilled], in my office today. [Please see my note below.] : Please see my note below. [Sincerely,] : Sincerely, [FreeTextEntry2] : Dr. Candida Schaeffer (Pulm)\par Dr. Richard Rocha (PCP) \par  [FreeTextEntry3] : \par \par \par Lea Aguilar\par Attending Surgeon\par Division of Thoracic Surgery\par \par [employer] [facility]

## 2019-10-10 NOTE — HISTORY OF PRESENT ILLNESS
[FreeTextEntry1] : Patient is a 68 year old male s/p TANNA lung volume reduction surgery, pneumolysis and mechanical pleurodesis, presents to [facility] for follow up.  Patient reports he is feeling well.  He is using Bevespi daily.  He reports no increased respiratory symptoms.  He is planning on traveling to [place]

## 2019-10-10 NOTE — HISTORY OF PRESENT ILLNESS
[FreeTextEntry1] : Mr. Jauregui is a 69 year old male who presents today for follow up.  He is a former smoker with a history of pulmonary bullous disease and pleural effusion. He was admitted to [facility] in August 2018 with dyspnea on exertion, leukocytosis and an upper lobe consolidation with loculated effusion, completed three weeks of IV antibiotics via PICC line then one week of Augmentin PO.\par \par He is s/p robotic assisted TANNA lung volume reduction surgery, pneumolysis, and mechanical pleurodesis on 10/17/18. Pathology revealed acute and chronic inflammation with abscess formation and organizing pneumonia. Wall of cystic lesion consistent with bullae.\par \par CXR today reveals:\par - Left lung postsurgical change, no pneumothorax\par - Probable enlarging bulla in the right lower lung\par - Increased lucencies in the anterior base and new oval lucency projecting over the mid chest on the lateral image likely bulla which are new and/or enlarged\par \par He has recently returned from [place] and he reports feeling well without interval changes in health.  He denies any fever, chills, cough, shortness of breath, chest pain, hemoptysis, or recent illness.  Of note, he continues to report postoperative numbness to left chest.  He has appointment for PFTs tomorrow with pulmonology.

## 2019-10-10 NOTE — PROCEDURE
[FreeTextEntry1] : PFT 11/29/2018 personally reviewed mild obstructive ventilator defect without gas exchange abnormality and no significant bronchodilator response\par \par CXR 10/20/2019 personally reviewed enlarging bullae in RLL.\par \par PFT 10/10/2019 personally reviewed minimal obstructive and mild restrictive ventilatory defect without gas exchange abnormality and insignificant bronchodilator response

## 2019-10-10 NOTE — PHYSICAL EXAM
[General Appearance - Well Developed] : well developed [General Appearance - Well Nourished] : well nourished [General Appearance - In No Acute Distress] : no acute distress [Neck Appearance] : the appearance of the neck was normal [Neck Cervical Mass (___cm)] : no neck mass was observed [Jugular Venous Distention Increased] : there was no jugular-venous distention [Heart Sounds] : normal S1 and S2 [Murmurs] : no murmurs present [Respiration, Rhythm And Depth] : normal respiratory rhythm and effort [Abdomen Soft] : soft [Abnormal Walk] : normal gait [Nail Clubbing] : no clubbing of the fingernails [Cyanosis, Localized] : no localized cyanosis [Non-Pitting] : non-pitting [Cranial Nerves] : cranial nerves 2-12 were intact [Skin Color & Pigmentation] : normal skin color and pigmentation [No Focal Deficits] : no focal deficits [Oriented To Time, Place, And Person] : oriented to person, place, and time [Enlarged Base of the Tongue] : no enlargement of the base of the tongue [Erythema] : no erythema of the pharynx

## 2019-10-10 NOTE — ASSESSMENT
[FreeTextEntry1] : 68 year old male Hx lung volume reduction surgery, pneumolysis, and mechanical pleurodesis presents for follow up, doing well on Bevespi\par \par Continue Bevespi 2 puffs twice daily\par Follow up 3-4 months

## 2019-10-10 NOTE — PHYSICAL EXAM
[Sclera] : the sclera and conjunctiva were normal [PERRL With Normal Accommodation] : pupils were equal in size, round, and reactive to light [Neck Appearance] : the appearance of the neck was normal [] : no respiratory distress [Respiration, Rhythm And Depth] : normal respiratory rhythm and effort [Murmurs] : no murmurs [Heart Sounds] : normal S1 and S2 [Examination Of The Chest] : the chest was normal in appearance [Edema] : there was no peripheral edema [Abdomen Soft] : soft [Abdomen Tenderness] : non-tender [Cervical Lymph Nodes Enlarged Posterior Bilaterally] : posterior cervical [Cervical Lymph Nodes Enlarged Anterior Bilaterally] : anterior cervical [Supraclavicular Lymph Nodes Enlarged Bilaterally] : supraclavicular [Abnormal Walk] : normal gait [Skin Color & Pigmentation] : normal skin color and pigmentation [Skin Turgor] : normal skin turgor [No Focal Deficits] : no focal deficits [Oriented To Time, Place, And Person] : oriented to person, place, and time [Affect] : the affect was normal [Mood] : the mood was normal

## 2019-10-14 ENCOUNTER — MEDICATION RENEWAL (OUTPATIENT)
Age: 69
End: 2019-10-14

## 2019-10-15 RX ORDER — GLYCOPYRROLATE AND FORMOTEROL FUMARATE 9; 4.8 UG/1; UG/1
9-4.8 AEROSOL, METERED RESPIRATORY (INHALATION)
Qty: 1 | Refills: 5 | Status: ACTIVE | COMMUNITY
Start: 2018-11-29 | End: 1900-01-01

## 2021-09-14 NOTE — ED ADULT NURSE NOTE - DOES PATIENT HAVE ADVANCE DIRECTIVE
Prescription approved per [facility] Refill Protocol.  Sharon Giang RN, BSN     No

## 2022-01-28 NOTE — DISCHARGE NOTE ADULT - HOSPITAL COURSE
Contacted the patient to see if she could come in earlier for her stress test. She said she could not.
10/17/18: Patient underwent a robotic assisted Left VATS, Pneumonolysis, left upper lobe lung volume reduction surgery.  Hospital course uncomplicated, chest tube was removed and patient was ready for discharge home on post operative day #3 (10/20/18)

## 2022-12-30 NOTE — PATIENT PROFILE ADULT. - TEACHING/LEARNING DEVELOPMENTAL CONSIDERATIONS
Do providers approve of seeing pt in clinic? Can same day or other slot be used to schedule? Please review/advise triage below.    Selena JUAREZ RN  [facility]    none

## 2023-07-19 NOTE — H&P PST ADULT - RS GEN PE MLT RESP DETAILS PC
4 = No assist / stand by assistance breath sounds equal/respirations non-labored/good air movement/airway patent/clear to auscultation bilaterally

## 2025-05-30 NOTE — DISCHARGE NOTE ADULT - NSTOBACCOWEBSITE_GEN_A_NCS
Medical Assessment Completed on: 30-May-2025 22:47
[place] Website --- [email or web address] website --- [email or web address]